# Patient Record
Sex: MALE | HISPANIC OR LATINO | Employment: FULL TIME | URBAN - METROPOLITAN AREA
[De-identification: names, ages, dates, MRNs, and addresses within clinical notes are randomized per-mention and may not be internally consistent; named-entity substitution may affect disease eponyms.]

---

## 2017-01-27 ENCOUNTER — APPOINTMENT (OUTPATIENT)
Dept: RADIOLOGY | Facility: MEDICAL CENTER | Age: 52
End: 2017-01-27
Attending: EMERGENCY MEDICINE
Payer: COMMERCIAL

## 2017-01-27 ENCOUNTER — HOSPITAL ENCOUNTER (EMERGENCY)
Facility: MEDICAL CENTER | Age: 52
End: 2017-01-27
Attending: EMERGENCY MEDICINE
Payer: COMMERCIAL

## 2017-01-27 VITALS
SYSTOLIC BLOOD PRESSURE: 151 MMHG | DIASTOLIC BLOOD PRESSURE: 79 MMHG | RESPIRATION RATE: 18 BRPM | BODY MASS INDEX: 32.27 KG/M2 | WEIGHT: 182.1 LBS | OXYGEN SATURATION: 98 % | TEMPERATURE: 97.3 F | HEART RATE: 79 BPM | HEIGHT: 63 IN

## 2017-01-27 DIAGNOSIS — S42.142A GLENOID FRACTURE OF SHOULDER, LEFT, CLOSED, INITIAL ENCOUNTER: ICD-10-CM

## 2017-01-27 DIAGNOSIS — S42.152A GLENOID FRACTURE OF SHOULDER, LEFT, CLOSED, INITIAL ENCOUNTER: ICD-10-CM

## 2017-01-27 PROCEDURE — 73200 CT UPPER EXTREMITY W/O DYE: CPT | Mod: LT

## 2017-01-27 PROCEDURE — 73030 X-RAY EXAM OF SHOULDER: CPT | Mod: LT

## 2017-01-27 PROCEDURE — 700102 HCHG RX REV CODE 250 W/ 637 OVERRIDE(OP): Performed by: EMERGENCY MEDICINE

## 2017-01-27 PROCEDURE — 36415 COLL VENOUS BLD VENIPUNCTURE: CPT

## 2017-01-27 PROCEDURE — A9270 NON-COVERED ITEM OR SERVICE: HCPCS | Performed by: EMERGENCY MEDICINE

## 2017-01-27 PROCEDURE — 99284 EMERGENCY DEPT VISIT MOD MDM: CPT

## 2017-01-27 RX ORDER — IBUPROFEN 600 MG/1
600 TABLET ORAL ONCE
Status: COMPLETED | OUTPATIENT
Start: 2017-01-27 | End: 2017-01-27

## 2017-01-27 RX ORDER — HYDROCODONE BITARTRATE AND ACETAMINOPHEN 5; 325 MG/1; MG/1
1 TABLET ORAL EVERY 4 HOURS PRN
Qty: 9 TAB | Refills: 0 | Status: SHIPPED | OUTPATIENT
Start: 2017-01-27

## 2017-01-27 RX ADMIN — IBUPROFEN 600 MG: 600 TABLET, FILM COATED ORAL at 20:58

## 2017-01-27 NOTE — ED AVS SNAPSHOT
Slantrange Access Code: [email or web address]  Expires: 2/26/2017 10:44 PM    Your email address is not on file at [facility].  Email Addresses are required for you to sign up for Slantrange, please contact [phone] to verify your personal information and to provide your email address prior to attempting to register for Slantrange.    Venanciojacinto Rosado  [address] [place] [address]    Slantrange  A secure, online tool to manage your health information     [facility]’s Slantrange® is a secure, online tool that connects you to your personalized health information from the privacy of your home -- day or night - making it very easy for you to manage your healthcare. Once the activation process is completed, you can even access your medical information using the Slantrange finn, which is available for free in the Apple Finn store or Google Play store.     To learn more about Slantrange, visit [email or web address]    There are two levels of access available (as shown below):   My Chart Features  [facility] Primary Care Doctor [facility]  Specialists [facility]  Urgent  Care Non-[facility] Primary Care Doctor   Email your healthcare team securely and privately 24/7 X X X    Manage appointments: schedule your next appointment; view details of past/upcoming appointments X      Request prescription refills. X      View recent personal medical records, including lab and immunizations X X X X   View health record, including health history, allergies, medications X X X X   Read reports about your outpatient visits, procedures, consult and ER notes X X X X   See your discharge summary, which is a recap of your hospital and/or ER visit that includes your diagnosis, lab results, and care plan X X  X     How to register for Audacioust:  Once your e-mail address has been verified, follow the following steps to sign up for Audacioust.     1. Go to  [email or web address]  2. Click on the Sign Up Now box, which takes you to the New Member Sign Up  page. You will need to provide the following information:  a. Enter your Tilson Access Code exactly as it appears at the top of this page. (You will not need to use this code after you’ve completed the sign-up process. If you do not sign up before the expiration date, you must request a new code.)   b. Enter your date of birth.   c. Enter your home email address.   d. Click Submit, and follow the next screen’s instructions.  3. Create a Tilson ID. This will be your Tilson login ID and cannot be changed, so think of one that is secure and easy to remember.  4. Create a Tilson password. You can change your password at any time.  5. Enter your Password Reset Question and Answer. This can be used at a later time if you forget your password.   6. Enter your e-mail address. This allows you to receive e-mail notifications when new information is available in Tilson.  7. Click Sign Up. You can now view your health information.    For assistance activating your Tilson account, call [phone]

## 2017-01-27 NOTE — LETTER
"  FORM C-4:  EMPLOYEE’S CLAIM FOR COMPENSATION/ REPORT OF INITIAL TREATMENT  EMPLOYEE’S CLAIM - PROVIDE ALL INFORMATION REQUESTED   First Name  Venancio Last Name  Paolo Rosado Birthdate             Age  1965 51 y.o. 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 (if applicable)  covering/concrete    How did this injury or occupational disease occur? Be specific and answer in detail. Use additional sheet if necessary)  I was covering, putting a blanket, on concrete.   If you believe that you have an occupational disease, when did you first have knowledge of the disability and it relationship to your employment?  n/a Witnesses to the Accident  Brenden Boone     Nature of Injury or Occupational Disease  Strain  Part(s) of Body Injured or Affected  Shoulder (L), N/A, N/A    I certify that the above is true and correct to the best of my knowledge and that I have provided this information in order to obtain the benefits of [place] " [place]).  I hereby authorize any physician, chiropractor, surgeon, practitioner, or other person, any hospital, including [facility] hospital, any medical service organization, any insurance company, or other institution or organization to release to each other, any medical or other information, including benefits paid or payable, pertinent to this injury or disease, except information relative to diagnosis, treatment and/or counseling for AIDS, psychological conditions, alcohol or controlled substances, for which I must give specific authorization.  A Photostat of this authorization shall be as valid as the original.   Date  1/27/2017 Place  [facility] ED Employee’s Signature   THIS REPORT MUST BE COMPLETED AND MAILED WITHIN 3 WORKING DAYS OF TREATMENT   Place  [facility], EMERGENCY DEPT  Name of Facility   [facility]   Date  1/27/2017 Diagnosis  (S42.142A,  S42.152A) Glenoid fracture of shoulder, left, closed, initial encounter Is there evidence the injured employee was under the influence of alcohol and/or another controlled substance at the time of accident?   Hour  10:28 PM Description of Injury or Disease  Glenoid fracture of shoulder, left, closed, initial encounter No   Treatment  Sling, pain medication  Have you advised the patient to remain off work five days or more?         No   X-Ray Findings  Positive   If Yes   From Date    To Date      From information given by the employee, together with medical evidence, can you directly connect this injury or occupational disease as job incurred?  Yes If No, is the employee capable of: Full Duty  No Modified Duty  Yes   Is additional medical care by a physician indicated?  Yes If Modified Duty, Specify any Limitations / Restrictions  Patient is able to perform tasks that do not require any use of the left  "arm     Do you know of any previous injury or disease contributing to this condition or occupational disease?  No   Date  1/27/2017 Print Doctor’s Name  Natividad Lynch certify the employer’s copy of this form was mailed on:   Address  [address] [address] [phone] Insurer’s Use Only   [place]  [id number]    Provider’s Tax ID Number  [id number] Telephone  Dept: [phone]    Doctor’s Signature  e-NATIVIDAD Robles M.D. Degree   MD    Original - TREATING PHYSICIAN OR CHIROPRACTOR   Pg 2-Insurer/TPA   Pg 3-Employer   Pg 4-Employee                                                                                                  Form C-4 (rev01/03)     BRIEF DESCRIPTION OF RIGHTS AND BENEFITS  (Pursuant to [place]RS 616C.050)    Notice of Injury or Occupational Disease (Incident Report Form C-1): If an injury or occupational disease (OD) arises out of and in the course of employment, you must provide written notice to your employer as soon as practicable, but no later than 7 days after the accident or OD. Your employer shall maintain a sufficient supply of the required forms.    Claim for Compensation (Form C-4): If medical treatment is sought, the form C-4 is available at the place of initial treatment. A completed \"Claim for Compensation\" (Form C-4) must be filed within 90 days after an accident or OD. The treating physician or chiropractor must, within 3 working days after treatment, complete and mail to the employer, the employer's insurer and third-party , the Claim for Compensation.    Medical Treatment: If you require medical treatment for your on-the-job injury or OD, you may be required to select a physician or chiropractor from a list provided by your workers’ compensation insurer, if it has contracted with an Organization for Managed Care (MCO) or Preferred Provider Organization (PPO) or providers of health care. If your employer has not entered into a " contract with an MCO or PPO, you may select a physician or chiropractor from the Panel of Physicians and Chiropractors. Any medical costs related to your industrial injury or OD will be paid by your insurer.    Temporary Total Disability (TTD): If your doctor has certified that you are unable to work for a period of at least 5 consecutive days, or 5 cumulative days in a 20-day period, or places restrictions on you that your employer does not accommodate, you may be entitled to TTD compensation.    Temporary Partial Disability (TPD): If the wage you receive upon reemployment is less than the compensation for TTD to which you are entitled, the insurer may be required to pay you TPD compensation to make up the difference. TPD can only be paid for a maximum of 24 months.    Permanent Partial Disability (PPD): When your medical condition is stable and there is an indication of a PPD as a result of your injury or OD, within 30 days, your insurer must arrange for an evaluation by a rating physician or chiropractor to determine the degree of your PPD. The amount of your PPD award depends on the date of injury, the results of the PPD evaluation and your age and wage.    Permanent Total Disability (PTD): If you are medically certified by a treating physician or chiropractor as permanently and totally disabled and have been granted a PTD status by your insurer, you are entitled to receive monthly benefits not to exceed 66 2/3% of your average monthly wage. The amount of your PTD payments is subject to reduction if you previously received a PPD award.    Vocational Rehabilitation Services: You may be eligible for vocational rehabilitation services if you are unable to return to the job due to a permanent physical impairment or permanent restrictions as a result of your injury or occupational disease.    Transportation and Per Rommel Reimbursement: You may be eligible for travel expenses and per rommel associated with medical  treatment.  Reopening: You may be able to reopen your claim if your condition worsens after claim closure.    Appeal Process: If you disagree with a written determination issued by the insurer or the insurer does not respond to your request, you may appeal to the Department of Administration, , by following the instructions contained in your determination letter. You must appeal the determination within 70 days from the date of the determination letter at [address], [address], [place], [place] [address], or [address], [address], [place], [place] [address]. If you disagree with the  decision, you may appeal to the Department of Administration, . You must file your appeal within 30 days from the date of the  decision letter at [address], [address], [place], [place] [address], or [address], [place], [place] [address]. If you disagree with a decision of an , you may file a petition for judicial review with the District Court. You must do so within 30 days of the Appeal Officer’s decision. You may be represented by an  at your own expense or you may contact the [facility] for possible representation.    [place]  for Injured Workers (NAIW): If you disagree with a  decision, you may request that NAIW represent you without charge at an  Hearing. For information regarding denial of benefits, you may contact the [facility] at: [address], [address], [place] [address], [phone], or [address], [address], [place] [address], [phone]    To File a Complaint with the Division: If you wish to file a complaint with the  of the Division of Industrial Relations (DIR), please contact the Workers’ Compensation Section, [address], [address], [place], [place] [address], telephone [phone]) [phone], or [address], [address], [place], [place] [address], telephone [phone].    For assistance with Workers’ Compensation Issues: you may contact the Office of the Governor Consumer Health Assistance, [address] [place] [address], [address], [place], [place] [address], Toll Free 1-929.169.3593, Web site: http://GoPlaceIt.[facility].[email or web address]., E-mail [email or web address].                                                                                                                                                                               __________________________________________________________________                                    _________________            Employee Name / Signature                                                                                                                            Date                                       D-2 (rev. 10/07)

## 2017-01-27 NOTE — ED AVS SNAPSHOT
1/27/2017          Venancio Rosado  [address] [address]    Dear Venancio:    [facility] wants to ensure your discharge home is safe and you or your loved ones have had all your questions answered regarding your care after you leave the hospital.    You may receive a telephone call within two days of your discharge.  This call is to make certain you understand your discharge instructions as well as ensure we provided you with the best care possible during your stay with us.     The call will only last approximately 3-5 minutes and will be done by a nurse.    Once again, we want to ensure your discharge home is safe and that you have a clear understanding of any next steps in your care.  If you have any questions or concerns, please do not hesitate to contact us, we are here for you.  Thank you for choosing [facility] for your healthcare needs.    Sincerely,    Gino Keyes    [facility]

## 2017-01-27 NOTE — ED AVS SNAPSHOT
After Visit Summary                                                                                                                Venancio Rosado   MRN: [id number]    Department:  [facility], Emergency Dept   Date of Visit:  1/27/2017            [facility], Emergency Dept    [address] [place] [id number]    Phone:  [phone]      You were seen by     Jen Lynch M.D.      Your Diagnosis Was     Glenoid fracture of shoulder, left, closed, initial encounter     S42.142A, S42.152A       These are the medications you received during your hospitalization from 01/27/2017 1946 to 01/27/2017 2244     Date/Time Order Dose Route Action    01/27/2017 2058 ibuprofen (MOTRIN) tablet 600 mg 600 mg Oral Given      Follow-up Information     1. Schedule an appointment as soon as possible for a visit with [facility].    Contact information    [address] [address] [phone]          2. Go to [facility], Emergency Dept.    Specialty:  Emergency Medicine    Why:  If symptoms worsen or do not continue to improve    Contact information    [address] [place] [address] [phone]      Medication Information     Review all of your home medications and newly ordered medications with your primary doctor and/or pharmacist as soon as possible. Follow medication instructions as directed by your doctor and/or pharmacist.     Please keep your complete medication list with you and share with your physician. Update the information when medications are discontinued, doses are changed, or new medications (including over-the-counter products) are added; and carry medication information at all times in the event of emergency situations.               Medication List      START taking these medications        Instructions    hydrocodone-acetaminophen 5-325 MG Tabs per tablet   Commonly known as:  NORCO    Take 1 Tab by mouth every four  hours as needed for up to 9 doses.   Dose:  1 Tab         ASK your doctor about these medications        Instructions    lisinopril 10 MG Tabs   Commonly known as:  PRINIVIL    Take 1 Tab by mouth every day.   Dose:  10 mg       metformin 500 MG Tabs   Commonly known as:  GLUCOPHAGE    Take 1 Tab by mouth 2 times a day, with meals.   Dose:  500 mg       oxycodone-acetaminophen 5-325 MG Tabs   Commonly known as:  PERCOCET    Take 1-2 Tabs by mouth every four hours as needed for Moderate Pain.   Dose:  1-2 Tab               Procedures and tests performed during your visit     CT-SHOULDER W/O LEFT    DX-SHOULDER 2+ LEFT    SLING        Discharge Instructions       Please follow-up with [facility] for physical therapy and orthopedic referral as indicated. Please return to the emergency department if you develop any worsening pain, or if you have any further concerns. Additionally, please return if you're not able to follow-up at occupational health. Please use the sling until told to remove it by a follow-up physician.      Shoulder Fracture  You have a fractured humerus (bone in the upper arm) at the shoulder just below the ball of the shoulder joint. Most of the time the bones of a broken shoulder are in an acceptable position. Usually the injury can be treated with a shoulder immobilizer or sling and swath bandage. These devices support the arm and prevent any shoulder movement. If the bones are not in a good position, then surgery is sometimes needed. Shoulder fractures usually cause swelling, pain, and discoloration around the upper arm initially. They heal in 8-12 weeks with proper treatment.  Rest in bed or a reclining chair as long as your shoulder is very painful. Sitting up generally results in less pain at the fracture site. Do not remove your shoulder bandage until your caregiver approves. You may apply ice packs over the shoulder for 20-30 minutes every 2 hours for the next 2-3 days to  reduce the pain and swelling. Use your pain medicine as prescribed.   SEEK IMMEDIATE MEDICAL CARE IF:  · You develop severe shoulder pain unrelieved by rest and taking pain medicine.  · You have pain, numbness, tingling, or weakness in the hand or wrist.  · You develop shortness of breath, chest pain, severe weakness, or fainting.  · You have severe pain with motion of the fingers or wrist.  MAKE SURE YOU:   · Understand these instructions.  · Will watch your condition.  · Will get help right away if you are not doing well or get worse.     This information is not intended to replace advice given to you by your health care provider. Make sure you discuss any questions you have with your health care provider.     Document Released: 01/25/2006 Document Revised: 03/11/2013 Document Reviewed: 04/07/2010  Criptext Interactive Patient Education ©2016 [employer]            Patient Information     Patient Information    Following emergency treatment: all patient requiring follow-up care must return either to a private physician or a clinic if your condition worsens before you are able to obtain further medical attention, please return to the emergency room.     Billing Information    At [facility], we work to make the billing process streamlined for our patients.  Our Representatives are here to answer any questions you may have regarding your hospital bill.  If you have insurance coverage and have supplied your insurance information to us, we will submit a claim to your insurer on your behalf.  Should you have any questions regarding your bill, we can be reached online or by phone as follows:  Online: You are able pay your bills online or live chat with our representatives about any billing questions you may have. We are here to help Monday - Friday from 8:00am to 7:30pm and 9:00am - 12:00pm on Saturdays.  Please visit [email or web address]  for more information.   Phone:  [phone]  or [phone]    Please note that your emergency physician, surgeon, pathologist, radiologist, anesthesiologist, and other specialists are not employed by [facility] and will therefore bill separately for their services.  Please contact them directly for any questions concerning their bills at the numbers below:     Emergency Physician Services:  [phone]  [facility]:  [phone]  [facility]:  [phone]  [facility]:  [phone]    1. Your final bill may vary from the amount quoted upon discharge if all procedures are not complete at that time, or if your doctor has additional procedures of which we are not aware. You will receive an additional bill if you return to the Emergency Department at [facility] for suture removal regardless of the facility of which the sutures were placed.     2. Please arrange for settlement of this account at the emergency registration.    3. All self-pay accounts are due in full at the time of treatment.  If you are unable to meet this obligation then payment is expected within 4-5 days.     4. If you have had radiology studies (CT, X-ray, Ultrasound, MRI), you have received a preliminary result during your emergency department visit. Please contact the radiology department [phone] to receive a copy of your final result. Please discuss the Final result with your primary physician or with the follow up physician provided.     Crisis Hotline:  [place] Crisis Hotline:  0-920-OPLHXDV or 1-574.929.6372  [place] Crisis Hotline:    1-825.940.4982 or 334-098-1546         ED Discharge Follow Up Questions    1. In order to provide you with very good care, we would like to follow up with a phone call in the next few days.  May we have your permission to contact you?     YES /  NO    2. What is the best phone number to call you? (       )_____-__________    3. What is the best time to call you?      Morning  /  Afternoon  /   Evening                   Patient Signature:  ____________________________________________________________    Date:  ____________________________________________________________

## 2017-01-28 NOTE — ED NOTES
Patient fell today during work and landed on his arm in an effort to brace himself, states he heard a pop, thinks it may be dislocated, pain with movement, no major deformity noted, no major swelling.

## 2017-01-28 NOTE — DISCHARGE INSTRUCTIONS
Please follow-up with [facility] occupational [facility] for physical therapy and orthopedic referral as indicated. Please return to the emergency department if you develop any worsening pain, or if you have any further concerns. Additionally, please return if you're not able to follow-up at occupational health. Please use the sling until told to remove it by a follow-up physician.      Shoulder Fracture  You have a fractured humerus (bone in the upper arm) at the shoulder just below the ball of the shoulder joint. Most of the time the bones of a broken shoulder are in an acceptable position. Usually the injury can be treated with a shoulder immobilizer or sling and swath bandage. These devices support the arm and prevent any shoulder movement. If the bones are not in a good position, then surgery is sometimes needed. Shoulder fractures usually cause swelling, pain, and discoloration around the upper arm initially. They heal in 8-12 weeks with proper treatment.  Rest in bed or a reclining chair as long as your shoulder is very painful. Sitting up generally results in less pain at the fracture site. Do not remove your shoulder bandage until your caregiver approves. You may apply ice packs over the shoulder for 20-30 minutes every 2 hours for the next 2-3 days to reduce the pain and swelling. Use your pain medicine as prescribed.   SEEK IMMEDIATE MEDICAL CARE IF:  · You develop severe shoulder pain unrelieved by rest and taking pain medicine.  · You have pain, numbness, tingling, or weakness in the hand or wrist.  · You develop shortness of breath, chest pain, severe weakness, or fainting.  · You have severe pain with motion of the fingers or wrist.  MAKE SURE YOU:   · Understand these instructions.  · Will watch your condition.  · Will get help right away if you are not doing well or get worse.     This information is not intended to replace advice given to you by your health care provider. Make sure you discuss any  questions you have with your health care provider.     Document Released: 01/25/2006 Document Revised: 03/11/2013 Document Reviewed: 04/07/2010  Elsevier Interactive Patient Education ©2016 Elsevier Inc.

## 2017-01-28 NOTE — ED PROVIDER NOTES
"      ED Provider Note    Scribed for Jen Lynch M.D. by Mamie Isaac. 1/27/2017, 8:23 PM.    Primary Care Provider: Pcp Pt States None  Means of arrival: walk-in  History obtained from: Patient  History limited by: None     CHIEF COMPLAINT  Chief Complaint   Patient presents with   • Shoulder Injury     left        HPI  Vneancio Rosado is a 51 y.o. male who presents to the Emergency Department for evaluation of left shoulder injury. Patient reports he fell today at work and landed on his left arm approximately 2 hours ago. He states he heard his left shoulder \"pop\" during the time of the accident. He states associated pain to left shoulder and reports pain is exacerbated by his lifting the arm. Denies taking any ibuprofen or tylenol for the pain. Denies loss of sensation to the arm. The patient denies any past pertinent medical history, use of daily medications or allergies to medications.    REVIEW OF SYSTEMS  Pertinent positives include left shoulder pain. Pertinent negatives include no loss of sensation to the left arm. See HPI for further details.     PAST MEDICAL HISTORY   has a past medical history of Gallstones.    SOCIAL HISTORY  Social History   Substance Use Topics   • Smoking status: Current Some Day Smoker     Types: Cigarettes   • Alcohol Use: Yes      Comment: couple a day before Dec/16      SURGICAL HISTORY   has past surgical history that includes nataliia by laparoscopy (N/A, 3/17/2016).     CURRENT MEDICATIONS    No current facility-administered medications on file prior to encounter.     Current Outpatient Prescriptions on File Prior to Encounter   Medication Sig Dispense Refill   • metformin (GLUCOPHAGE) 500 MG Tab Take 1 Tab by mouth 2 times a day, with meals. 60 Tab 0   • lisinopril (PRINIVIL) 10 MG Tab Take 1 Tab by mouth every day. 30 Tab 0   • oxycodone-acetaminophen (PERCOCET) 5-325 MG Tab Take 1-2 Tabs by mouth every four hours as needed for Moderate Pain. 28 Tab 0 " "    ALLERGIES  No Known Allergies    PHYSICAL EXAM  VITAL SIGNS: /99 mmHg  Pulse 74  Temp(Src) 36.3 °C (97.3 °F)  Resp 14  Ht 1.6 m (5' 3\")  Wt 82.6 kg (182 lb 1.6 oz)  BMI 32.27 kg/m2  SpO2 98%  Constitutional: Alert in no apparent distress.  HENT: Normocephalic, Bilateral external ears normal. Nose normal.    Skin: Visualized skin is  Dry, No erythema, No rash. No skin breakdown, no lacerations or abrasions  Extremities: Left shoulder with anterior soft tissue tenderness to palpation, limited range of motion on abduction, no visible deformity. Left upper extremity neurovascularly intact with normal capillary refill, 2+ radial pulses, sensory and motor function intact in median, radial and ulnar nerve distributions. Soft compartments. No wrist or elbow tenderness to palpation.  Neurologic: Motor and sensory function intact as documented above  Psychiatric: Affect and Mood normal    Radiology results show:   CT-SHOULDER W/O LEFT   Final Result      1.  Acute LEFT inferior glenoid fracture.   2.  Small ossific density at the superior glenoid, of uncertain acuity.   3.  No LEFT shoulder dislocation.      DX-SHOULDER 2+ LEFT   Final Result      1.  Apparent fracture of the inferior glenoid.   2.  No LEFT shoulder dislocation.          COURSE & MEDICAL DECISION MAKING  Nursing notes, VS, PMSFHx reviewed in chart.    8:23 PM - Patient seen and examined at bedside. Ordered DX-Shoulder 2+ Left to evaluate his symptoms. I explained to the patient I will order an xray to rule out fracture or dislocation.     8:59 PM Recheck: Patient is resting comfortably and reports feeling improved. I updated him on his results, which showed a potential fracture. Discussed with patient a CT-Shoulder will be completed for further evaluation. He verbalizes understanding and agreement. Ordered CT-Shoulder W/O Left. Patient will be treated with Motrin 600 mg at this time.     10:10 PM I reviewed patient's CT-Shoulder imaging, " paged orthopedics.     10:18 PM I discussed the patient's case and the above findings with Dr. Crawford (orthopedics) who advised patient's shoulder be stabilized with a sling and to follow up with occupational health.    10:30 PM Patient is resting comfortably and reports feeling improved. I updated him on his results, which showed an apparent fracture. I explained that he is now stable for discharge with a prescription for Norco as needed. I advised him to follow up with [facility] Occupational Health and to return to the ED for worsening of shoulder pain or any other medical problems. He understands and will comply.     Decision Making:  This is a 51 y.o. year old who presents with left shoulder injury after a recent fall. Plain film was concerning for glenoid fracture, CT ordered which confirms left inferior glenoid fracture. No fevers, no overlying warmth or redness, no evidence of open fracture. I did discuss recommendations with Dr. Calle who recommends sling, patient's follow-up will be through [facility] occupational health who will then refer to orthopedics as needed. Patient is neurovascularly intact. Return precautions given including worsening pain or inability to arrange follow-up with occupational health.    I reviewed prescription monitoring program for patient's narcotic use before prescribing a scheduled drug.The patient will not drink alcohol nor drive with prescribed medications. The patient will return for new or worsening symptoms and is stable at the time of discharge.    The patient is referred to a primary physician for blood pressure management, diabetic screening, and for all other preventative health concerns.    DISPOSITION:  Patient will be discharged home in stable condition.    FOLLOW UP:  [address] [address]  [phone]    Schedule an appointment as soon as possible for a visit      [facility], Emergency Dept  [address] [place] [address]  [place] [address]  [phone]  Go to  If symptoms worsen or do not continue to improve      OUTPATIENT MEDICATIONS:  Discharge Medication List as of 1/27/2017 10:44 PM      START taking these medications    Details   hydrocodone-acetaminophen (NORCO) 5-325 MG Tab per tablet Take 1 Tab by mouth every four hours as needed for up to 9 doses., Disp-9 Tab, R-0, Print Rx Paper           FINAL IMPRESSION  1. Glenoid fracture of shoulder, left, closed, initial encounter         IMamie (Scribe), am scribing for, and in the presence of, Jen Lynch M.D..    Electronically signed by: Mamie Isaac (Scribe), 1/27/2017    IJen M.D. personally performed the services described in this documentation, as scribed by Mamie Isaac in my presence, and it is both accurate and complete.    The note accurately reflects work and decisions made by me.  Jen Lynch  1/27/2017  11:11 PM

## 2017-02-01 ENCOUNTER — OCCUPATIONAL MEDICINE (OUTPATIENT)
Dept: OCCUPATIONAL MEDICINE | Facility: CLINIC | Age: 52
End: 2017-02-01
Payer: COMMERCIAL

## 2017-02-01 VITALS
SYSTOLIC BLOOD PRESSURE: 132 MMHG | HEIGHT: 64 IN | OXYGEN SATURATION: 95 % | DIASTOLIC BLOOD PRESSURE: 78 MMHG | RESPIRATION RATE: 14 BRPM | HEART RATE: 94 BPM | WEIGHT: 185 LBS | TEMPERATURE: 98 F | BODY MASS INDEX: 31.58 KG/M2

## 2017-02-01 DIAGNOSIS — S42.152D: ICD-10-CM

## 2017-02-01 DIAGNOSIS — S42.142D: ICD-10-CM

## 2017-02-01 PROCEDURE — 99204 OFFICE O/P NEW MOD 45 MIN: CPT | Performed by: PREVENTIVE MEDICINE

## 2017-02-01 RX ORDER — IBUPROFEN 600 MG/1
600 TABLET ORAL EVERY 6 HOURS PRN
COMMUNITY
End: 2017-02-07 | Stop reason: SDUPTHER

## 2017-02-01 ASSESSMENT — PAIN SCALES - GENERAL: PAINLEVEL: 6=MODERATE PAIN

## 2017-02-01 NOTE — Clinical Note
"[facility]   [address] [place] [address],   [address] [address]  Phone: [phone] - Fax: [phone]        [facility] Progress Report and Disability Certification  Date of Service: 2/1/2017   No Show:  No  Date / Time of Next Visit: 2/7/2017@4:00PM    Claim Information   Patient Name: Venancio Rosado  Claim Number:     Employer:   [employer]  Date of Injury: 1/27/2017     Insurer / TPA: [employer]  ID / SSN:     Occupation: finisher  Diagnosis: The encounter diagnosis was Glenoid fracture of shoulder, left, with routine healing, subsequent encounter.    Medical Information   Related to Industrial Injury? Yes    Subjective Complaints:  DOI 1/27/2017: Patient was covering concrete when he tripped and fell, landing on his left shoulder. He heard a \"pop\" when he landed. Seen in ED, XR and CT scan showed inferior glenoid fracture of the left shoulder with 2mm articular gap. Ortho was consulted via phone and recommended sling and follow up with [facility], referral to Ortho if needed. Patient states the pain has been improving. However he still has the difficulty  lifting his arm at all. He has been in the sling since the injury. Denies any numbness or tingling. He takes ibuprofen for pain relief which has been sufficient. He is right-handed.   Objective Findings: Left Shoulder: No gross deformity. Tenderness palpation anterior GH and lateral shoulder. Decreased range of motion globally especially with flexion 40°, abduction 40°.  strength intact. Reflexes 2+/4.   Pre-Existing Condition(s):     Assessment:   Condition Improved    Status: Additional Care Required  Permanent Disability:No    Plan:      Diagnostics:      Comments:  Continue use sling most the time, use at work, may take off for short periods of time while at home  Begin pendulum and range of motion exercises as tolerated  Continue ibuprofen as needed  May begin unrestricted " work  Follow-up one week    Disability Information   Status: Released to Restricted Duty    From:  2/1/2017  Through: 2/7/2017 Restrictions are: Temporary   Physical Restrictions   Sitting:    Standing:    Stooping:    Bending:      Squatting:    Walking:    Climbing:    Pushing:      Pulling:    Other:    Reaching Above Shoulder (L):   Reaching Above Shoulder (R):       Reaching Below Shoulder (L):    Reaching Below Shoulder (R):      Not to exceed Weight Limits   Carrying(hrs):   Weight Limit(lb):   Lifting(hrs):   Weight  Limit(lb):     Comments: No use of left arm. Must use sling at work    Repetitive Actions   Hands: i.e. Fine Manipulations from Grasping:     Feet: i.e. Operating Foot Controls:     Driving / Operate Machinery:     Physician Name: Toi Campbell D.O. Physician Signature: TOI Marrero D.O. e-Signature: Dr. Srini Canela, Medical Director   Clinic Name / Location: [address],   [address] [place] [address] Clinic Phone Number: Dept: [phone]   Appointment Time: 4:00 Pm Visit Start Time: 3:45 PM   Check-In Time:  3:34 Pm Visit Discharge Time:  @4:09PM   Original-Treating Physician or Chiropractor    Page 2-Insurer/TPA    Page 3-Employer    Page 4-Employee

## 2017-02-01 NOTE — MR AVS SNAPSHOT
"        Venancio BooneOdalis   2017 4:00 PM   Occupational Medicine   MRN: [id number]    Department:  [facility]   Dept Phone:  [phone]    Description:  Male : 1965   Provider:  Toi Campbell D.O.           Reason for Visit     Follow-Up WC DOI 2017 - Shoulder - Better - ROOM 2      Allergies as of 2017     No Known Allergies      You were diagnosed with     Glenoid fracture of shoulder, left, with routine healing, subsequent encounter   [[id number]]         Vital Signs     Blood Pressure Pulse Temperature Respirations Height Weight    132/78 mmHg 94 36.7 °C (98 °F) 14 1.626 m (5' 4.02\") 83.915 kg (185 lb)    Body Mass Index Oxygen Saturation Smoking Status             31.74 kg/m2 95% Current Some Day Smoker         Basic Information     Date Of Birth Sex Race Ethnicity Preferred Language    1965 Male Patient Refused  Origin (Irish,Kittitian,Brazilian,Michael, etc) English      Your appointments     2017  4:00 PM   Workers Compensation with Toi Campbell D.O.   [address]  [address]  [facility] [address]   [phone]              Health Maintenance     Patient has no pending health maintenance at this time      Current Immunizations     No immunizations on file.      Below and/or attached are the medications your provider expects you to take. Review all of your home medications and newly ordered medications with your provider and/or pharmacist. Follow medication instructions as directed by your provider and/or pharmacist. Please keep your medication list with you and share with your provider. Update the information when medications are discontinued, doses are changed, or new medications (including over-the-counter products) are added; and carry medication information at all times in the event of emergency situations     Allergies:  No Known Allergies          Medications  Valid as of: 2017 -  4:20 PM   " Generic Name Brand Name Tablet Size Instructions for use    Hydrocodone-Acetaminophen (Tab) NORCO 5-325 MG Take 1 Tab by mouth every four hours as needed for up to 9 doses.        Ibuprofen (Tab) MOTRIN 600 MG Take 600 mg by mouth every 6 hours as needed.        Lisinopril (Tab) PRINIVIL 10 MG Take 1 Tab by mouth every day.        MetFORMIN HCl (Tab) GLUCOPHAGE 500 MG Take 1 Tab by mouth 2 times a day, with meals.        Oxycodone-Acetaminophen (Tab) PERCOCET 5-325 MG Take 1-2 Tabs by mouth every four hours as needed for Moderate Pain.        .                 Medicines prescribed today were sent to:     [facility] #[id number] - [place], [place] - [address] [facility]    [address] [facility] [address]    Phone: [phone] Fax: [phone]    Open 24 Hours?: No      Medication refill instructions:       If your prescription bottle indicates you have medication refills left, it is not necessary to call your provider’s office. Please contact your pharmacy and they will refill your medication.    If your prescription bottle indicates you do not have any refills left, you may request refills at any time through one of the following ways: The online [employer] Private system (except Urgent Care), by calling your provider’s office, or by asking your pharmacy to contact your provider’s office with a refill request. Medication refills are processed only during regular business hours and may not be available until the next business day. Your provider may request additional information or to have a follow-up visit with you prior to refilling your medication.   *Please Note: Medication refills are assigned a new Rx number when refilled electronically. Your pharmacy may indicate that no refills were authorized even though a new prescription for the same medication is available at the pharmacy. Please request the medicine by name with the pharmacy before contacting your provider for a refill.           [employer] Access Code:  [email or web address]  Expires: 2/26/2017 10:44 PM    [employer]  A secure, online tool to manage your health information     [employer]’s EquityZen® is a secure, online tool that connects you to your personalized health information from the privacy of your home -- day or night - making it very easy for you to manage your healthcare. Once the activation process is completed, you can even access your medical information using the EquityZen finn, which is available for free in the Apple Finn store or Google Play store.     EquityZen provides the following levels of access (as shown below):   My Chart Features   [facility] [place] Primary Care Doctor [facility]  Specialists [facility]  Urgent  Care Non-[facility]  Primary Care  Doctor   Email your healthcare team securely and privately 24/7 X X X    Manage appointments: schedule your next appointment; view details of past/upcoming appointments X      Request prescription refills. X      View recent personal medical records, including lab and immunizations X X X X   View health record, including health history, allergies, medications X X X X   Read reports about your outpatient visits, procedures, consult and ER notes X X X X   See your discharge summary, which is a recap of your hospital and/or ER visit that includes your diagnosis, lab results, and care plan. X X       How to register for EquityZen:  1. Go to  [email or web address].  2. Click on the Sign Up Now box, which takes you to the New Member Sign Up page. You will need to provide the following information:  a. Enter your EquityZen Access Code exactly as it appears at the top of this page. (You will not need to use this code after you’ve completed the sign-up process. If you do not sign up before the expiration date, you must request a new code.)   b. Enter your date of birth.   c. Enter your home email address.   d. Click Submit, and follow the next screen’s instructions.  3. Create a EquityZen ID. This will be your EquityZen login ID and cannot be  changed, so think of one that is secure and easy to remember.  4. Create a [employer] password. You can change your password at any time.  5. Enter your Password Reset Question and Answer. This can be used at a later time if you forget your password.   6. Enter your e-mail address. This allows you to receive e-mail notifications when new information is available in [employer].  7. Click Sign Up. You can now view your health information.    For assistance activating your [employer] account, call [phone]

## 2017-02-02 NOTE — PROGRESS NOTES
"Subjective:      Venancio Rosado is a 51 y.o. male who presents with Follow-Up      DOI 1/27/2017: Patient was covering concrete when he tripped and fell, landing on his left shoulder. He heard a \"pop\" when he landed. Seen in ED, XR and CT scan showed inferior glenoid fracture of the left shoulder with 2mm articular gap. Ortho was consulted via phone and recommended sling and follow up with [facility], referral to Ortho if needed. Patient states the pain has been improving. However he still has the difficulty  lifting his arm at all. He has been in the sling since the injury. Denies any numbness or tingling. He takes ibuprofen for pain relief which has been sufficient. He is right-handed.     HPI    ROS  PMH:  has a past medical history of Gallstones.  MEDS:   Current outpatient prescriptions:   •  ibuprofen (MOTRIN) 600 MG Tab, Take 600 mg by mouth every 6 hours as needed., Disp: , Rfl:   •  hydrocodone-acetaminophen (NORCO) 5-325 MG Tab per tablet, Take 1 Tab by mouth every four hours as needed for up to 9 doses., Disp: 9 Tab, Rfl: 0  •  metformin (GLUCOPHAGE) 500 MG Tab, Take 1 Tab by mouth 2 times a day, with meals., Disp: 60 Tab, Rfl: 0  •  lisinopril (PRINIVIL) 10 MG Tab, Take 1 Tab by mouth every day., Disp: 30 Tab, Rfl: 0  •  oxycodone-acetaminophen (PERCOCET) 5-325 MG Tab, Take 1-2 Tabs by mouth every four hours as needed for Moderate Pain., Disp: 28 Tab, Rfl: 0  ALLERGIES: No Known Allergies  SURGHX:   Past Surgical History   Procedure Laterality Date   • Mary by laparoscopy N/A 3/17/2016     Procedure: MARY BY LAPAROSCOPY;  Surgeon: Danyel Seymour M.D.;  Location: SURGERY [facility];  Service:      SOCHX:  reports that he has been smoking Cigarettes.  He does not have any smokeless tobacco history on file. He reports that he drinks alcohol.  FH: family history is not on file.       Objective:     /78 mmHg  Pulse 94  Temp(Src) 36.7 °C (98 °F)  Resp 14  Ht 1.626 m (5' 4.02\")  Wt " 83.915 kg (185 lb)  BMI 31.74 kg/m2  SpO2 95%     Physical Exam   Constitutional: He appears well-developed.   Cardiovascular: Normal rate.    Pulmonary/Chest: Effort normal.   Skin: Skin is warm and dry.   Psychiatric: He has a normal mood and affect. His behavior is normal.       Left Shoulder: No gross deformity. Tenderness palpation anterior GH and lateral shoulder. Decreased range of motion globally especially with flexion 40°, abduction 40°.  strength intact. Reflexes 2+/4.       Assessment/Plan:     1. Glenoid fracture of shoulder, left, with routine healing, subsequent encounter  Continue use sling most the time, use at work, may take off for short periods of time while at home  Begin pendulum and range of motion exercises as tolerated  Continue ibuprofen as needed  May begin unrestricted work  Follow-up one week

## 2017-02-07 ENCOUNTER — OCCUPATIONAL MEDICINE (OUTPATIENT)
Dept: OCCUPATIONAL MEDICINE | Facility: CLINIC | Age: 52
End: 2017-02-07
Payer: COMMERCIAL

## 2017-02-07 VITALS
WEIGHT: 185 LBS | HEIGHT: 64 IN | DIASTOLIC BLOOD PRESSURE: 98 MMHG | TEMPERATURE: 98.1 F | HEART RATE: 90 BPM | SYSTOLIC BLOOD PRESSURE: 160 MMHG | RESPIRATION RATE: 16 BRPM | BODY MASS INDEX: 31.58 KG/M2 | OXYGEN SATURATION: 94 %

## 2017-02-07 DIAGNOSIS — S42.142D: ICD-10-CM

## 2017-02-07 DIAGNOSIS — S42.152D: ICD-10-CM

## 2017-02-07 PROCEDURE — 99213 OFFICE O/P EST LOW 20 MIN: CPT | Performed by: PREVENTIVE MEDICINE

## 2017-02-07 RX ORDER — IBUPROFEN 600 MG/1
600 TABLET ORAL EVERY 6 HOURS PRN
Qty: 60 TAB | Refills: 0 | Status: SHIPPED | OUTPATIENT
Start: 2017-02-07 | End: 2017-03-21 | Stop reason: SDUPTHER

## 2017-02-07 NOTE — MR AVS SNAPSHOT
"        Venancio BooneOdalis   2017 4:00 PM   Occupational Medicine   MRN: [id number]    Department:  [facility]   Dept Phone:  [phone]    Description:  Male : 1965   Provider:  Toi Campbell D.O.           Reason for Visit     Follow-Up Lt shoulder feeling better      Allergies as of 2017     No Known Allergies      You were diagnosed with     Glenoid fracture of shoulder, left, with routine healing, subsequent encounter   [[id number]]         Vital Signs     Blood Pressure Pulse Temperature Respirations Height Weight    160/98 mmHg 90 36.7 °C (98.1 °F) 16 1.626 m (5' 4\") 83.915 kg (185 lb)    Body Mass Index Oxygen Saturation Smoking Status             31.74 kg/m2 94% Current Some Day Smoker         Basic Information     Date Of Birth Sex Race Ethnicity Preferred Language    1965 Male Patient Refused  Origin (Danish,Ivorian,Indian,Michael, etc) English      Your appointments     2017  4:40 PM   Workers Compensation with Toi Campbell D.O.   [address] [address]   [phone]              Health Maintenance        Date Due Completion Dates    IMM DTaP/Tdap/Td Vaccine (1 - Tdap) 1985 ---    COLONOSCOPY 2016 ---    IMM INFLUENZA (1) 2016 ---            Current Immunizations     No immunizations on file.      Below and/or attached are the medications your provider expects you to take. Review all of your home medications and newly ordered medications with your provider and/or pharmacist. Follow medication instructions as directed by your provider and/or pharmacist. Please keep your medication list with you and share with your provider. Update the information when medications are discontinued, doses are changed, or new medications (including over-the-counter products) are added; and carry medication information at all times in the event of emergency situations     Allergies:  No Known " Allergies          Medications  Valid as of: February 07, 2017 -  4:34 PM    Generic Name Brand Name Tablet Size Instructions for use    Hydrocodone-Acetaminophen (Tab) NORCO 5-325 MG Take 1 Tab by mouth every four hours as needed for up to 9 doses.        Ibuprofen (Tab) MOTRIN 600 MG Take 1 Tab by mouth every 6 hours as needed.        Lisinopril (Tab) PRINIVIL 10 MG Take 1 Tab by mouth every day.        MetFORMIN HCl (Tab) GLUCOPHAGE 500 MG Take 1 Tab by mouth 2 times a day, with meals.        Oxycodone-Acetaminophen (Tab) PERCOCET 5-325 MG Take 1-2 Tabs by mouth every four hours as needed for Moderate Pain.        .                 Medicines prescribed today were sent to:     [id number] - [facility] - [address]    [address] [facility] [address]    Phone: [phone] Fax: [phone]    Open 24 Hours?: No      Medication refill instructions:       If your prescription bottle indicates you have medication refills left, it is not necessary to call your provider’s office. Please contact your pharmacy and they will refill your medication.    If your prescription bottle indicates you do not have any refills left, you may request refills at any time through one of the following ways: The online [email or web address] system (except Urgent Care), by calling your provider’s office, or by asking your pharmacy to contact your provider’s office with a refill request. Medication refills are processed only during regular business hours and may not be available until the next business day. Your provider may request additional information or to have a follow-up visit with you prior to refilling your medication.   *Please Note: Medication refills are assigned a new Rx number when refilled electronically. Your pharmacy may indicate that no refills were authorized even though a new prescription for the same medication is available at the pharmacy. Please request the medicine by name with the pharmacy before  contacting your provider for a refill.           [employer] Access Code: [email or web address]  Expires: 2/26/2017 10:44 PM    [employer]  A secure, online tool to manage your health information     [employer]® is a secure, online tool that connects you to your personalized health information from the privacy of your home -- day or night - making it very easy for you to manage your healthcare. Once the activation process is completed, you can even access your medical information using the [employer] finn, which is available for free in the Apple Finn store or Google Play store.     [employer] provides the following levels of access (as shown below):   My Chart Features   [facility] Primary Care Doctor [facility]  Specialists [facility]  Urgent  Care Non-[facility]  Primary Care  Doctor   Email your healthcare team securely and privately 24/7 X X X    Manage appointments: schedule your next appointment; view details of past/upcoming appointments X      Request prescription refills. X      View recent personal medical records, including lab and immunizations X X X X   View health record, including health history, allergies, medications X X X X   Read reports about your outpatient visits, procedures, consult and ER notes X X X X   See your discharge summary, which is a recap of your hospital and/or ER visit that includes your diagnosis, lab results, and care plan. X X       How to register for [employer]:  1. Go to  [email or web address].  2. Click on the Sign Up Now box, which takes you to the New Member Sign Up page. You will need to provide the following information:  a. Enter your [employer] Access Code exactly as it appears at the top of this page. (You will not need to use this code after you’ve completed the sign-up process. If you do not sign up before the expiration date, you must request a new code.)   b. Enter your date of birth.   c. Enter your home email address.   d. Click Submit, and follow the next screen’s  instructions.  3. Create a Briggot ID. This will be your [employer] login ID and cannot be changed, so think of one that is secure and easy to remember.  4. Create a [employer] password. You can change your password at any time.  5. Enter your Password Reset Question and Answer. This can be used at a later time if you forget your password.   6. Enter your e-mail address. This allows you to receive e-mail notifications when new information is available in [employer].  7. Click Sign Up. You can now view your health information.    For assistance activating your [employer] account, call [phone]

## 2017-02-07 NOTE — Clinical Note
"[facility] [place]   [address],   [address] [address]  Phone: [phone] - Fax: [phone]        [facility] Progress Report and Disability Certification  Date of Service: 2/7/2017   No Show:  No  Date / Time of Next Visit: 2/21/2017   Claim Information   Patient Name: Venancio Rosado  Claim Number:     Employer:   *** Date of Injury: 1/27/2017     Insurer / TPA: [employer] *** ID / SSN:     Occupation: finisher *** Diagnosis: The encounter diagnosis was Glenoid fracture of shoulder, left, with routine healing, subsequent encounter.    Medical Information   Related to Industrial Injury? Yes ***   Subjective Complaints:  DOI 1/27/2017: Patient was covering concrete when he tripped and fell, landing on his left shoulder. He heard a \"pop\" when he landed. Seen in ED, XR and CT scan showed inferior glenoid fracture of the left shoulder with 2mm articular gap. Ortho was consulted via phone and recommended sling and follow up with [facility], referral to Ortho if needed. Patient states the pain has been improving. He notes small amount improvement in his range of motion. He states he remains in a sling while outside the home but will occasionally take off the sling. Patient states that his work does not have any job which he can do with just one arm. He takes ibuprofen 600 mg for relief which has been helping. He has been doing the range of motion exercises that were prescribed last visit.   Objective Findings: Left Shoulder: No gross deformity. Tenderness palpation anterior GH and lateral shoulder. Decreased range of motion globally especially with flexion 70°, abduction 50°.  strength intact. Reflexes 2+/4.   Pre-Existing Condition(s):     Assessment:   Condition Same    Status: Additional Care Required  Permanent Disability:No    Plan:      Diagnostics:      Comments:  Continue range of motion stretches  Use sling for comfort  Refill " ibuprofen 600 mg  Continue restricted duty  Follow-up 2 weeks    Disability Information   Status: Released to Restricted Duty    From:  2/7/2017  Through: 2/21/2017 Restrictions are: Temporary   Physical Restrictions   Sitting:    Standing:    Stooping:    Bending:      Squatting:    Walking:    Climbing:    Pushing:      Pulling:    Other:    Reaching Above Shoulder (L):   Reaching Above Shoulder (R):       Reaching Below Shoulder (L):    Reaching Below Shoulder (R):      Not to exceed Weight Limits   Carrying(hrs):   Weight Limit(lb):   Lifting(hrs):   Weight  Limit(lb):     Comments: No use of right arm. Must use sling at work.    Repetitive Actions   Hands: i.e. Fine Manipulations from Grasping:     Feet: i.e. Operating Foot Controls:     Driving / Operate Machinery:     Physician Name: Toi Campbell D.O. Physician Signature: TOI Marrero D.O. e-Signature: Dr. Srini Canela, Medical Director   Clinic Name / Location: [address],   [address] [place] [address] Clinic Phone Number: Dept: [phone]   Appointment Time: 4:00 Pm Visit Start Time: 3:56 PM   Check-In Time:  3:49 Pm Visit Discharge Time:  ***   Original-Treating Physician or Chiropractor    Page 2-Insurer/TPA    Page 3-Employer    Page 4-Employee

## 2017-02-07 NOTE — Clinical Note
"[facility] [place]   [address],   [address] [address]  Phone: [phone] - Fax: [phone]        [facility] Progress Report and Disability Certification  Date of Service: 2/7/2017   No Show:  No  Date / Time of Next Visit: 2/21/2017 @4:40 PM   Claim Information   Patient Name: Venancio Rosado  Claim Number:     Employer:  [employer] Date of Injury: 1/27/2017     Insurer / TPA: [employer]  ID / SSN:     Occupation: finisher  Diagnosis: The encounter diagnosis was Glenoid fracture of shoulder, left, with routine healing, subsequent encounter.    Medical Information   Related to Industrial Injury? Yes    Subjective Complaints:  DOI 1/27/2017: Patient was covering concrete when he tripped and fell, landing on his left shoulder. He heard a \"pop\" when he landed. Seen in ED, XR and CT scan showed inferior glenoid fracture of the left shoulder with 2mm articular gap. Ortho was consulted via phone and recommended sling and follow up with [facility], referral to Ortho if needed. Patient states the pain has been improving. He notes small amount improvement in his range of motion. He states he remains in a sling while outside the home but will occasionally take off the sling. Patient states that his work does not have any job which he can do with just one arm. He takes ibuprofen 600 mg for relief which has been helping. He has been doing the range of motion exercises that were prescribed last visit.   Objective Findings: Left Shoulder: No gross deformity. Tenderness palpation anterior GH and lateral shoulder. Decreased range of motion globally especially with flexion 70°, abduction 50°.  strength intact. Reflexes 2+/4.   Pre-Existing Condition(s):     Assessment:   Condition Same    Status: Additional Care Required  Permanent Disability:No    Plan:      Diagnostics:      Comments:  Continue range of motion stretches  Use sling for " comfort  Refill ibuprofen 600 mg  Continue restricted duty  Follow-up 2 weeks    Disability Information   Status: Released to Restricted Duty    From:  2/7/2017  Through: 2/21/2017 Restrictions are: Temporary   Physical Restrictions   Sitting:    Standing:    Stooping:    Bending:      Squatting:    Walking:    Climbing:    Pushing:      Pulling:    Other:    Reaching Above Shoulder (L):   Reaching Above Shoulder (R):       Reaching Below Shoulder (L):    Reaching Below Shoulder (R):      Not to exceed Weight Limits   Carrying(hrs):   Weight Limit(lb):   Lifting(hrs):   Weight  Limit(lb):     Comments: No use of left arm. Must use sling at work.    Repetitive Actions   Hands: i.e. Fine Manipulations from Grasping:     Feet: i.e. Operating Foot Controls:     Driving / Operate Machinery:     Physician Name: Toi Campbell D.O. Physician Signature: TOI Marrero D.O. e-Signature: Dr. Srini Canela, Medical Director   Clinic Name / Location: [address],   [address] [place] [address] Clinic Phone Number: Dept: [phone]   Appointment Time: 4:00 Pm Visit Start Time: 3:56 PM   Check-In Time:  3:49 Pm Visit Discharge Time:  4:28 PM   Original-Treating Physician or Chiropractor    Page 2-Insurer/TPA    Page 3-Employer    Page 4-Employee

## 2017-02-08 NOTE — PROGRESS NOTES
"Subjective:      Venancio Rosado is a 51 y.o. male who presents with Follow-Up      DOI 1/27/2017: Patient was covering concrete when he tripped and fell, landing on his left shoulder. He heard a \"pop\" when he landed. Seen in ED, XR and CT scan showed inferior glenoid fracture of the left shoulder with 2mm articular gap. Ortho was consulted via phone and recommended sling and follow up with [facility], referral to Ortho if needed. Patient states the pain has been improving. He notes small amount improvement in his range of motion. He states he remains in a sling while outside the home but will occasionally take off the sling. Patient states that his work does not have any job which he can do with just one arm. He takes ibuprofen 600 mg for relief which has been helping. He has been doing the range of motion exercises that were prescribed last visit.     HPI    ROS  PMH:  has a past medical history of Gallstones.  MEDS:   Current outpatient prescriptions:   •  ibuprofen (MOTRIN) 600 MG Tab, Take 1 Tab by mouth every 6 hours as needed., Disp: 60 Tab, Rfl: 0  •  hydrocodone-acetaminophen (NORCO) 5-325 MG Tab per tablet, Take 1 Tab by mouth every four hours as needed for up to 9 doses., Disp: 9 Tab, Rfl: 0  •  metformin (GLUCOPHAGE) 500 MG Tab, Take 1 Tab by mouth 2 times a day, with meals., Disp: 60 Tab, Rfl: 0  •  lisinopril (PRINIVIL) 10 MG Tab, Take 1 Tab by mouth every day., Disp: 30 Tab, Rfl: 0  •  oxycodone-acetaminophen (PERCOCET) 5-325 MG Tab, Take 1-2 Tabs by mouth every four hours as needed for Moderate Pain., Disp: 28 Tab, Rfl: 0  ALLERGIES: No Known Allergies  SURGHX:   Past Surgical History   Procedure Laterality Date   • Mary by laparoscopy N/A 3/17/2016     Procedure: MARY BY LAPAROSCOPY;  Surgeon: Danyel Seymour M.D.;  Location: SURGERY [facility];  Service:      SOCHX:  reports that he has been smoking Cigarettes.  He does not have any smokeless tobacco history on file. He reports that he " "drinks alcohol.  FH: family history is not on file.       Objective:     /98 mmHg  Pulse 90  Temp(Src) 36.7 °C (98.1 °F)  Resp 16  Ht 1.626 m (5' 4\")  Wt 83.915 kg (185 lb)  BMI 31.74 kg/m2  SpO2 94%     Physical Exam   Constitutional: He is oriented to person, place, and time. He appears well-developed and well-nourished.   Cardiovascular: Normal rate.    Pulmonary/Chest: Effort normal.   Neurological: He is alert and oriented to person, place, and time.   Skin: Skin is warm and dry.   Psychiatric: He has a normal mood and affect. His behavior is normal.       Left Shoulder: No gross deformity. Tenderness palpation anterior GH and lateral shoulder. Decreased range of motion globally especially with flexion 70°, abduction 50°.  strength intact. Reflexes 2+/4.       Assessment/Plan:     1. Glenoid fracture of shoulder, left, with routine healing, subsequent encounter  - ibuprofen (MOTRIN) 600 MG Tab; Take 1 Tab by mouth every 6 hours as needed.  Dispense: 60 Tab; Refill: 0    Continue range of motion stretches  Use sling for comfort  Refill ibuprofen 600 mg  Continue restricted duty  Follow-up 2 weeks    "

## 2017-02-21 ENCOUNTER — OCCUPATIONAL MEDICINE (OUTPATIENT)
Dept: OCCUPATIONAL MEDICINE | Facility: CLINIC | Age: 52
End: 2017-02-21
Payer: COMMERCIAL

## 2017-02-21 VITALS
HEIGHT: 64 IN | TEMPERATURE: 97.9 F | BODY MASS INDEX: 31.58 KG/M2 | OXYGEN SATURATION: 94 % | HEART RATE: 87 BPM | RESPIRATION RATE: 14 BRPM | WEIGHT: 185 LBS | DIASTOLIC BLOOD PRESSURE: 78 MMHG | SYSTOLIC BLOOD PRESSURE: 146 MMHG

## 2017-02-21 DIAGNOSIS — S42.152D: ICD-10-CM

## 2017-02-21 DIAGNOSIS — S42.142D: ICD-10-CM

## 2017-02-21 PROCEDURE — 99204 OFFICE O/P NEW MOD 45 MIN: CPT | Performed by: PREVENTIVE MEDICINE

## 2017-02-21 NOTE — Clinical Note
"[address],   [address] [address]  Phone: [phone] - Fax: [phone]        [facility] Progress Report and Disability Certification  Date of Service: 2/21/2017   No Show:  No  Date / Time of Next Visit: 3/7/2017@2:20 PM   Claim Information2:   Patient Name: Venancio Rosado  Claim Number:     Employer:   [employer] Date of Injury: 1/27/2017     Insurer / TPA: [employer]  ID / SSN:     Occupation: finisher  Diagnosis: The encounter diagnosis was Glenoid fracture of shoulder, left, with routine healing, subsequent encounter.    Medical Information   Related to Industrial Injury? Yes    Subjective Complaints:  DOI 1/27/2017: Patient was covering concrete when he tripped and fell, landing on his left shoulder. He heard a \"pop\" when he landed. Seen in ED, XR and CT scan showed inferior glenoid fracture of the left shoulder with 2mm articular gap. Ortho was consulted via phone and recommended sling and follow up with [facility], referral to Ortho if needed. Patient states that his pain has been improving. He states that his range of motion is improving as well he has been doing the pendulum exercises as prescribed. He states that his arm is sore at the end of those but they have been helping. He takes ibuprofen 6 mg for relief needed. He is not using the sling quite as much as before.   Objective Findings: Left Shoulder: No gross deformity. Tenderness palpation anterior GH and lateral shoulder. Decreased range of motion globally especially with flexion 120°, abduction 90°.  strength intact. Reflexes 2+/4.   Pre-Existing Condition(s):     Assessment:   Condition Improved    Status: Additional Care Required  Permanent Disability:No    Plan:      Diagnostics:      Comments:  Continue to use sling as needed  Continue range of motion exercises  Referral to PT, do not begin until after next visit  Restricted duty, " follow up 2 weeks    Disability Information   Status: Released to Restricted Duty    From:  2/21/2017  Through: 3/7/2017 Restrictions are: Temporary   Physical Restrictions   Sitting:    Standing:    Stooping:    Bending:      Squatting:    Walking:    Climbing:    Pushing:      Pulling:    Other:    Reaching Above Shoulder (L):   Reaching Above Shoulder (R):       Reaching Below Shoulder (L):    Reaching Below Shoulder (R):      Not to exceed Weight Limits   Carrying(hrs):   Weight Limit(lb):   Lifting(hrs):   Weight  Limit(lb):     Comments: Minimal use of left arm. Must use sling at work    Repetitive Actions   Hands: i.e. Fine Manipulations from Grasping:     Feet: i.e. Operating Foot Controls:     Driving / Operate Machinery:     Physician Name: Toi Campbell D.O. Physician Signature: TOI Marrero D.O. e-Signature: Dr. Srini Canela, Medical Director   Clinic Name / Location: [address],   [address] [place] [address] Clinic Phone Number: Dept: [phone]   Appointment Time: 4:40 Pm Visit Start Time: 4:41 PM   Check-In Time:  4:35 Pm Visit Discharge Time:  5:25 PM   Original-Treating Physician or Chiropractor    Page 2-Insurer/TPA    Page 3-Employer    Page 4-Employee

## 2017-02-21 NOTE — MR AVS SNAPSHOT
"        Venancio Rosado   2017 4:40 PM   Occupational Medicine   MRN: [id number]    Department:  [facility]   Dept Phone:  [phone]    Description:  Male : 1965   Provider:  Toi Campbell D.O.           Reason for Visit     Follow-Up WC FV (L) shoulder, pt states he is getting better but there is still pain with movement       Allergies as of 2017     No Known Allergies      You were diagnosed with     Glenoid fracture of shoulder, left, with routine healing, subsequent encounter   [[id number]]         Vital Signs     Blood Pressure Pulse Temperature Respirations Height Weight    146/78 mmHg 87 36.6 °C (97.9 °F) 14 1.626 m (5' 4\") 83.915 kg (185 lb)    Body Mass Index Oxygen Saturation Smoking Status             31.74 kg/m2 94% Current Some Day Smoker         Basic Information     Date Of Birth Sex Race Ethnicity Preferred Language    1965 Male Patient Refused  Origin (Wolof,Iraqi,Ukrainian,Eritrean, etc) English      Health Maintenance        Date Due Completion Dates    IMM DTaP/Tdap/Td Vaccine (1 - Tdap) 1985 ---    COLONOSCOPY 2016 ---    IMM INFLUENZA (1) 2016 ---            Current Immunizations     No immunizations on file.      Below and/or attached are the medications your provider expects you to take. Review all of your home medications and newly ordered medications with your provider and/or pharmacist. Follow medication instructions as directed by your provider and/or pharmacist. Please keep your medication list with you and share with your provider. Update the information when medications are discontinued, doses are changed, or new medications (including over-the-counter products) are added; and carry medication information at all times in the event of emergency situations     Allergies:  No Known Allergies          Medications  Valid as of: 2017 -  5:01 PM    Generic Name Brand Name Tablet Size Instructions for use   " Hydrocodone-Acetaminophen (Tab) NORCO 5-325 MG Take 1 Tab by mouth every four hours as needed for up to 9 doses.        Ibuprofen (Tab) MOTRIN 600 MG Take 1 Tab by mouth every 6 hours as needed.        Lisinopril (Tab) PRINIVIL 10 MG Take 1 Tab by mouth every day.        MetFORMIN HCl (Tab) GLUCOPHAGE 500 MG Take 1 Tab by mouth 2 times a day, with meals.        Oxycodone-Acetaminophen (Tab) PERCOCET 5-325 MG Take 1-2 Tabs by mouth every four hours as needed for Moderate Pain.        .                 Medicines prescribed today were sent to:     [facility]/PHARMACY #[id number] - [place], [place] - [address]    [address] [address]    Phone: [phone] Fax: [phone]    Open 24 Hours?: No      Medication refill instructions:       If your prescription bottle indicates you have medication refills left, it is not necessary to call your provider’s office. Please contact your pharmacy and they will refill your medication.    If your prescription bottle indicates you do not have any refills left, you may request refills at any time through one of the following ways: The online [employer] Multimedia system (except Urgent Care), by calling your provider’s office, or by asking your pharmacy to contact your provider’s office with a refill request. Medication refills are processed only during regular business hours and may not be available until the next business day. Your provider may request additional information or to have a follow-up visit with you prior to refilling your medication.   *Please Note: Medication refills are assigned a new Rx number when refilled electronically. Your pharmacy may indicate that no refills were authorized even though a new prescription for the same medication is available at the pharmacy. Please request the medicine by name with the pharmacy before contacting your provider for a refill.        Your To Do List     Future Labs/Procedures Complete By Expires    CT-SHOULDER W/O LEFT  As  directed 2/21/2018      Referral     A referral request has been sent to our patient care coordination department. Please allow 3-5 business days for us to process this request and contact you either by phone or mail. If you do not hear from us by the 5th business day, please call us at [phone].           Benhauer Access Code: [email or web address]  Expires: 2/26/2017 10:44 PM    Benhauer  A secure, online tool to manage your health information     [employer]’s Benhauer® is a secure, online tool that connects you to your personalized health information from the privacy of your home -- day or night - making it very easy for you to manage your healthcare. Once the activation process is completed, you can even access your medical information using the Benhauer finn, which is available for free in the Apple Finn store or Google Play store.     Benhauer provides the following levels of access (as shown below):   My Chart Features   [facility] Primary Care Doctor [facility]  Specialists [facility]  Urgent  Care Non-[facility]  Primary Care  Doctor   Email your healthcare team securely and privately 24/7 X X X    Manage appointments: schedule your next appointment; view details of past/upcoming appointments X      Request prescription refills. X      View recent personal medical records, including lab and immunizations X X X X   View health record, including health history, allergies, medications X X X X   Read reports about your outpatient visits, procedures, consult and ER notes X X X X   See your discharge summary, which is a recap of your hospital and/or ER visit that includes your diagnosis, lab results, and care plan. X X       How to register for Benhauer:  1. Go to  [email or web address].  2. Click on the Sign Up Now box, which takes you to the New Member Sign Up page. You will need to provide the following information:  a. Enter your Benhauer Access Code exactly as it appears at the top of this page. (You will not need to use  this code after you’ve completed the sign-up process. If you do not sign up before the expiration date, you must request a new code.)   b. Enter your date of birth.   c. Enter your home email address.   d. Click Submit, and follow the next screen’s instructions.  3. Create a MoneyExpertt ID. This will be your MoneyExpertt login ID and cannot be changed, so think of one that is secure and easy to remember.  4. Create a MoneyExpertt password. You can change your password at any time.  5. Enter your Password Reset Question and Answer. This can be used at a later time if you forget your password.   6. Enter your e-mail address. This allows you to receive e-mail notifications when new information is available in Oculus VR.  7. Click Sign Up. You can now view your health information.    For assistance activating your Oculus VR account, call [phone]

## 2017-02-22 NOTE — PROGRESS NOTES
"Subjective:      Venancio Rosado is a 51 y.o. male who presents with Follow-Up      DOI 1/27/2017: Patient was covering concrete when he tripped and fell, landing on his left shoulder. He heard a \"pop\" when he landed. Seen in ED, XR and CT scan showed inferior glenoid fracture of the left shoulder with 2mm articular gap. Ortho was consulted via phone and recommended sling and follow up with [facility], referral to Ortho if needed. Patient states that his pain has been improving. He states that his range of motion is improving as well he has been doing the pendulum exercises as prescribed. He states that his arm is sore at the end of those but they have been helping. He takes ibuprofen 6 mg for relief needed. He is not using the sling quite as much as before.     HPI    ROS  PMH:  has a past medical history of Gallstones.  MEDS:   Current outpatient prescriptions:   •  ibuprofen (MOTRIN) 600 MG Tab, Take 1 Tab by mouth every 6 hours as needed., Disp: 60 Tab, Rfl: 0  •  hydrocodone-acetaminophen (NORCO) 5-325 MG Tab per tablet, Take 1 Tab by mouth every four hours as needed for up to 9 doses., Disp: 9 Tab, Rfl: 0  •  metformin (GLUCOPHAGE) 500 MG Tab, Take 1 Tab by mouth 2 times a day, with meals., Disp: 60 Tab, Rfl: 0  •  lisinopril (PRINIVIL) 10 MG Tab, Take 1 Tab by mouth every day., Disp: 30 Tab, Rfl: 0  •  oxycodone-acetaminophen (PERCOCET) 5-325 MG Tab, Take 1-2 Tabs by mouth every four hours as needed for Moderate Pain., Disp: 28 Tab, Rfl: 0  ALLERGIES: No Known Allergies  SURGHX:   Past Surgical History   Procedure Laterality Date   • Mary by laparoscopy N/A 3/17/2016     Procedure: MARY BY LAPAROSCOPY;  Surgeon: Danyel Seymour M.D.;  Location: SURGERY [facility];  Service:      SOCHX:  reports that he has been smoking Cigarettes.  He does not have any smokeless tobacco history on file. He reports that he drinks alcohol.  FH: family history is not on file.       Objective:     /78 mmHg  " "Pulse 87  Temp(Src) 36.6 °C (97.9 °F)  Resp 14  Ht 1.626 m (5' 4\")  Wt 83.915 kg (185 lb)  BMI 31.74 kg/m2  SpO2 94%     Physical Exam   Constitutional: He appears well-developed and well-nourished.   Cardiovascular: Normal rate.    Pulmonary/Chest: Effort normal.   Skin: Skin is warm and dry.   Psychiatric: He has a normal mood and affect. His behavior is normal.       Left Shoulder: No gross deformity. Tenderness palpation anterior GH and lateral shoulder. Decreased range of motion globally especially with flexion 120°, abduction 90°.  strength intact. Reflexes 2+/4.       Assessment/Plan:     1. Glenoid fracture of shoulder, left, with routine healing, subsequent encounter  - REFERRAL TO RADIOLOGY  - CT-SHOULDER W/O LEFT; Future  - REFERRAL TO PHYSICAL THERAPY Reason for Therapy: Eval/Treat/Report    Continue to use sling as needed  Continue range of motion exercises  Referral to PT, do not begin until after next visit  Restricted duty, follow up 2 weeks      "

## 2017-03-03 ENCOUNTER — HOSPITAL ENCOUNTER (OUTPATIENT)
Dept: RADIOLOGY | Facility: MEDICAL CENTER | Age: 52
End: 2017-03-03
Attending: PREVENTIVE MEDICINE
Payer: COMMERCIAL

## 2017-03-03 DIAGNOSIS — S42.142D: ICD-10-CM

## 2017-03-03 DIAGNOSIS — S42.152D: ICD-10-CM

## 2017-03-03 PROCEDURE — 73200 CT UPPER EXTREMITY W/O DYE: CPT | Mod: LT

## 2017-03-07 ENCOUNTER — OCCUPATIONAL MEDICINE (OUTPATIENT)
Dept: OCCUPATIONAL MEDICINE | Facility: CLINIC | Age: 52
End: 2017-03-07
Payer: COMMERCIAL

## 2017-03-07 VITALS
DIASTOLIC BLOOD PRESSURE: 104 MMHG | BODY MASS INDEX: 31.58 KG/M2 | WEIGHT: 185 LBS | HEART RATE: 88 BPM | OXYGEN SATURATION: 100 % | SYSTOLIC BLOOD PRESSURE: 162 MMHG | HEIGHT: 64 IN | TEMPERATURE: 97.5 F | RESPIRATION RATE: 14 BRPM

## 2017-03-07 DIAGNOSIS — S42.142D: ICD-10-CM

## 2017-03-07 DIAGNOSIS — S42.152D: ICD-10-CM

## 2017-03-07 PROCEDURE — 99212 OFFICE O/P EST SF 10 MIN: CPT | Performed by: PREVENTIVE MEDICINE

## 2017-03-07 ASSESSMENT — PAIN SCALES - GENERAL: PAINLEVEL: 5=MODERATE PAIN

## 2017-03-07 NOTE — MR AVS SNAPSHOT
"        Venancio BooneOdalis   3/7/2017 2:20 PM   Occupational Medicine   MRN: [id number]    Department:  [facility]   Dept Phone:  [phone]    Description:  Male : 1965   Provider:  Toi Campbell D.O.           Reason for Visit     Follow-Up WC DOI 2017 - L Shoulder - Better - Room 3      Allergies as of 3/7/2017     No Known Allergies      You were diagnosed with     Glenoid fracture of shoulder, left, with routine healing, subsequent encounter   [[id number]]         Vital Signs     Blood Pressure Pulse Temperature Respirations Height Weight    162/104 mmHg 88 36.4 °C (97.5 °F) 14 1.626 m (5' 4.02\") 83.915 kg (185 lb)    Body Mass Index Oxygen Saturation Smoking Status             31.74 kg/m2 100% Current Some Day Smoker         Basic Information     Date Of Birth Sex Race Ethnicity Preferred Language    1965 Male Patient Refused  Origin (Canadian,Portuguese,Djiboutian,Prydeinig, etc) English      Health Maintenance        Date Due Completion Dates    IMM DTaP/Tdap/Td Vaccine (1 - Tdap) 1985 ---    COLONOSCOPY 2016 ---    IMM INFLUENZA (1) 2016 ---            Current Immunizations     No immunizations on file.      Below and/or attached are the medications your provider expects you to take. Review all of your home medications and newly ordered medications with your provider and/or pharmacist. Follow medication instructions as directed by your provider and/or pharmacist. Please keep your medication list with you and share with your provider. Update the information when medications are discontinued, doses are changed, or new medications (including over-the-counter products) are added; and carry medication information at all times in the event of emergency situations     Allergies:  No Known Allergies          Medications  Valid as of: 2017 -  2:23 PM    Generic Name Brand Name Tablet Size Instructions for use    Hydrocodone-Acetaminophen (Tab) NORCO 5-325 MG Take " 1 Tab by mouth every four hours as needed for up to 9 doses.        Ibuprofen (Tab) MOTRIN 600 MG Take 1 Tab by mouth every 6 hours as needed.        Lisinopril (Tab) PRINIVIL 10 MG Take 1 Tab by mouth every day.        MetFORMIN HCl (Tab) GLUCOPHAGE 500 MG Take 1 Tab by mouth 2 times a day, with meals.        Oxycodone-Acetaminophen (Tab) PERCOCET 5-325 MG Take 1-2 Tabs by mouth every four hours as needed for Moderate Pain.        .                 Medicines prescribed today were sent to:     [facility]/PHARMACY #[id number] - [facility] [place] - [address]    [address] [address]    Phone: [phone] Fax: [phone]    Open 24 Hours?: No      Medication refill instructions:       If your prescription bottle indicates you have medication refills left, it is not necessary to call your provider’s office. Please contact your pharmacy and they will refill your medication.    If your prescription bottle indicates you do not have any refills left, you may request refills at any time through one of the following ways: The online XYZE system (except Urgent Care), by calling your provider’s office, or by asking your pharmacy to contact your provider’s office with a refill request. Medication refills are processed only during regular business hours and may not be available until the next business day. Your provider may request additional information or to have a follow-up visit with you prior to refilling your medication.   *Please Note: Medication refills are assigned a new Rx number when refilled electronically. Your pharmacy may indicate that no refills were authorized even though a new prescription for the same medication is available at the pharmacy. Please request the medicine by name with the pharmacy before contacting your provider for a refill.           Meet Youhart Status: Patient Declined

## 2017-03-07 NOTE — PROGRESS NOTES
"Subjective:      Venancio Rosado is a 51 y.o. male who presents with Follow-Up      DOI 1/27/2017: Patient was covering concrete when he tripped and fell, landing on his left shoulder. He heard a \"pop\" when he landed. Seen in ED, XR and CT scan showed inferior glenoid fracture of the left shoulder with 2mm articular gap. Patient states that the pain continues to improve. He states that he continues to have more range of motion than before. He states that physical therapy was approved but has not had some appointment. Denies any radiating pain, numbness or tingling.     HPI    ROS    PMH:  has a past medical history of Gallstones.  MEDS:   Current outpatient prescriptions:   •  ibuprofen (MOTRIN) 600 MG Tab, Take 1 Tab by mouth every 6 hours as needed., Disp: 60 Tab, Rfl: 0  •  hydrocodone-acetaminophen (NORCO) 5-325 MG Tab per tablet, Take 1 Tab by mouth every four hours as needed for up to 9 doses., Disp: 9 Tab, Rfl: 0  •  metformin (GLUCOPHAGE) 500 MG Tab, Take 1 Tab by mouth 2 times a day, with meals., Disp: 60 Tab, Rfl: 0  •  lisinopril (PRINIVIL) 10 MG Tab, Take 1 Tab by mouth every day., Disp: 30 Tab, Rfl: 0  •  oxycodone-acetaminophen (PERCOCET) 5-325 MG Tab, Take 1-2 Tabs by mouth every four hours as needed for Moderate Pain., Disp: 28 Tab, Rfl: 0  ALLERGIES: No Known Allergies  SURGHX:   Past Surgical History   Procedure Laterality Date   • Mary by laparoscopy N/A 3/17/2016     Procedure: MARY BY LAPAROSCOPY;  Surgeon: Danyel Seymour M.D.;  Location: SURGERY [facility];  Service:      SOCHX:  reports that he has been smoking Cigarettes.  He does not have any smokeless tobacco history on file. He reports that he drinks alcohol.  FH: family history is not on file.     Objective:     /104 mmHg  Pulse 88  Temp(Src) 36.4 °C (97.5 °F)  Resp 14  Ht 1.626 m (5' 4.02\")  Wt 83.915 kg (185 lb)  BMI 31.74 kg/m2  SpO2 100%     Physical Exam   Constitutional: He appears well-developed and " well-nourished.   Cardiovascular: Normal rate.    Pulmonary/Chest: Effort normal.   Skin: Skin is warm and dry.   Psychiatric: He has a normal mood and affect. His behavior is normal.   Vitals reviewed.      Left Shoulder: No gross deformity. Tenderness palpation anterior GH and lateral shoulder. Decreased range of motion with flexion 160°, abduction 120°.  strength intact. Reflexes 2+/4.       Assessment/Plan:     1. Glenoid fracture of shoulder, left, with routine healing, subsequent encounter  CT Left Shoulder: Anterior-inferior glenoid fracture with 1.9mm articular gap. Evidence of fracture healing.  Begin physical therapy  Lessen restrictions, may begin lifting and using right arm  OTC Ibuprofen as needed  Follow up 2 weeks  See PCP regarding elevated blood pressure

## 2017-03-07 NOTE — Clinical Note
"[facility]   [address] [place] [address],   [address] [address]  Phone: [phone] - Fax: [phone]        [facility] Progress Report and Disability Certification  Date of Service: 3/7/2017   No Show:  No  Date / Time of Next Visit: 3/21/2017@2:20PM   Claim Information   Patient Name: Venancio Rosado  Claim Number:     Employer:   [employer]  Date of Injury: 1/27/2017     Insurer / TPA: [employer]  ID / SSN:     Occupation: finisher  Diagnosis: The encounter diagnosis was Glenoid fracture of shoulder, left, with routine healing, subsequent encounter.    Medical Information   Related to Industrial Injury? Yes    Subjective Complaints:  DOI 1/27/2017: Patient was covering concrete when he tripped and fell, landing on his left shoulder. He heard a \"pop\" when he landed. Seen in ED, XR and CT scan showed inferior glenoid fracture of the left shoulder with 2mm articular gap. Patient states that the pain continues to improve. He states that he continues to have more range of motion than before. He states that physical therapy was approved but has not had some appointment. Denies any radiating pain, numbness or tingling.   Objective Findings: Left Shoulder: No gross deformity. Tenderness palpation anterior GH and lateral shoulder. Decreased range of motion with flexion 160°, abduction 120°.  strength intact. Reflexes 2+/4.   Pre-Existing Condition(s):     Assessment:   Condition Improved    Status: Additional Care Required  Permanent Disability:No    Plan:      Diagnostics:      Comments:  CT Left Shoulder: Anterior-inferior glenoid fracture with 1.9mm articular gap. Evidence of fracture healing.  Begin physical therapy  Lessen restrictions, may begin lifting and using right arm  OTC Ibuprofen as needed  Follow up 2 weeks    Disability Information   Status: Released to Restricted Duty    From:  3/7/2017  Through: 3/21/2017 Restrictions are: " Temporary   Physical Restrictions   Sitting:    Standing:    Stooping:    Bending:      Squatting:    Walking:    Climbing:    Pushing:      Pulling:    Other:    Reaching Above Shoulder (L): < or = to 2 hrs/day Reaching Above Shoulder (R):       Reaching Below Shoulder (L):    Reaching Below Shoulder (R):      Not to exceed Weight Limits   Carrying(hrs):   Weight Limit(lb): < or = to 25 pounds Lifting(hrs):   Weight  Limit(lb): < or = to 25 pounds   Comments:      Repetitive Actions   Hands: i.e. Fine Manipulations from Grasping:     Feet: i.e. Operating Foot Controls:     Driving / Operate Machinery:     Physician Name: Toi Campbell D.O. Physician Signature: TOI Marrero D.O. e-Signature: Dr. Srini Canela, Medical Director   Clinic Name / Location: [address],   [address] [place] [id number] Clinic Phone Number: Dept: [phone]   Appointment Time: 2:20 Pm Visit Start Time: 2:11 PM   Check-In Time:  2:06 Pm Visit Discharge Time:  @2:30PM    Original-Treating Physician or Chiropractor    Page 2-Insurer/TPA    Page 3-Employer    Page 4-Employee

## 2017-03-21 ENCOUNTER — OCCUPATIONAL MEDICINE (OUTPATIENT)
Dept: OCCUPATIONAL MEDICINE | Facility: CLINIC | Age: 52
End: 2017-03-21
Payer: COMMERCIAL

## 2017-03-21 VITALS
RESPIRATION RATE: 16 BRPM | HEART RATE: 78 BPM | WEIGHT: 177 LBS | DIASTOLIC BLOOD PRESSURE: 88 MMHG | TEMPERATURE: 98 F | SYSTOLIC BLOOD PRESSURE: 140 MMHG | HEIGHT: 64 IN | OXYGEN SATURATION: 94 % | BODY MASS INDEX: 30.22 KG/M2

## 2017-03-21 DIAGNOSIS — S42.152D: ICD-10-CM

## 2017-03-21 DIAGNOSIS — S42.142D: ICD-10-CM

## 2017-03-21 PROCEDURE — 99213 OFFICE O/P EST LOW 20 MIN: CPT | Performed by: PREVENTIVE MEDICINE

## 2017-03-21 RX ORDER — IBUPROFEN 600 MG/1
600 TABLET ORAL EVERY 6 HOURS PRN
Qty: 60 TAB | Refills: 0 | Status: SHIPPED | OUTPATIENT
Start: 2017-03-21

## 2017-03-21 ASSESSMENT — ENCOUNTER SYMPTOMS
CONSTITUTIONAL NEGATIVE: 1
NEUROLOGICAL NEGATIVE: 1
GASTROINTESTINAL NEGATIVE: 1

## 2017-03-21 ASSESSMENT — PAIN SCALES - GENERAL: PAINLEVEL: 5=MODERATE PAIN

## 2017-03-21 NOTE — Clinical Note
"[facility]   [address],   [address] [address]  Phone: [phone] - Fax: [phone]        [facility] Progress Report and Disability Certification  Date of Service: 3/21/2017   No Show:  No  Date / Time of Next Visit: 4/4/2017@2:00PM    Claim Information   Patient Name: Venancio Rosado  Claim Number:     Employer:   [employer]    Date of Injury: 1/27/2017     Insurer / TPA: [employer]  ID / SSN:     Occupation: finisher  Diagnosis: The encounter diagnosis was Glenoid fracture of shoulder, left, with routine healing, subsequent encounter.    Medical Information   Related to Industrial Injury? Yes    Subjective Complaints:  DOI 1/27/2017: Patient was covering concrete when he tripped and fell, landing on his left shoulder. He heard a \"pop\" when he landed. Seen in ED, XR and CT scan showed inferior glenoid fracture of the left shoulder with 2mm articular gap. Repeat CT showed evidence of healing and no displacement. Patient has noted some improvement in range of motion and pain. I will restart continue cefepime especially with external and internal rotation movement shoulder. Continues to have difficulty also with abduction. Takes ibuprofen for relief. He states he called physical therapy they do not have an appointment until April 5.   Objective Findings: Left Shoulder: No gross deformity. Tenderness palpation anterior GH and lateral shoulder. Decreased range of motion with flexion 170°, abduction 120°.  strength intact. Reflexes 2+/4.   Pre-Existing Condition(s):     Assessment:   Condition Same    Status: Additional Care Required  Permanent Disability:No    Plan:      Diagnostics:      Comments:  Call case  and try to get seen at another clinic  Ibuprofen 600 mg as needed for pain  Continue restricted duty  Follow-up 2 weeks      Disability Information   Status: Released to Restricted Duty    From:  " 3/21/2017  Through: 4/4/2017 Restrictions are: Temporary   Physical Restrictions   Sitting:    Standing:    Stooping:    Bending:      Squatting:    Walking:    Climbing:    Pushing:      Pulling:    Other:    Reaching Above Shoulder (L):   Reaching Above Shoulder (R): < or = 2 hrs/day     Reaching Below Shoulder (L):    Reaching Below Shoulder (R):      Not to exceed Weight Limits   Carrying(hrs):   Weight Limit(lb): < or = to 25 pounds Lifting(hrs):   Weight  Limit(lb): < or = to 25 pounds   Comments:      Repetitive Actions   Hands: i.e. Fine Manipulations from Grasping:     Feet: i.e. Operating Foot Controls:     Driving / Operate Machinery:     Physician Name: Toi Campbell D.O. Physician Signature: TOI Marrero D.O. e-Signature: Dr. Srini Canela, Medical Director   Clinic Name / Location: [address],   [address] [place] [address] Clinic Phone Number: Dept: [phone]   Appointment Time: 2:20 Pm Visit Start Time: 2:18 PM   Check-In Time:  2:06 Pm Visit Discharge Time:  @2:50PM   Original-Treating Physician or Chiropractor    Page 2-Insurer/TPA    Page 3-Employer    Page 4-Employee

## 2017-03-21 NOTE — PROGRESS NOTES
"Subjective:      Venancio Rosado is a 51 y.o. male who presents with Work-Related Injury      DOI 1/27/2017: Patient was covering concrete when he tripped and fell, landing on his left shoulder. He heard a \"pop\" when he landed. Seen in ED, XR and CT scan showed inferior glenoid fracture of the left shoulder with 2mm articular gap. Repeat CT showed evidence of healing and no displacement. Patient has noted some improvement in range of motion and pain. I will restart continue cefepime especially with external and internal rotation movement shoulder. Continues to have difficulty also with abduction. Takes ibuprofen for relief. He states he called physical therapy they do not have an appointment until April 5.     HPI    Review of Systems   Constitutional: Negative.    HENT: Negative.    Gastrointestinal: Negative.    Musculoskeletal: Positive for joint pain.   Neurological: Negative.      PMH:  has a past medical history of Gallstones.  MEDS:   Current outpatient prescriptions:   •  ibuprofen (MOTRIN) 600 MG Tab, Take 1 Tab by mouth every 6 hours as needed., Disp: 60 Tab, Rfl: 0  •  hydrocodone-acetaminophen (NORCO) 5-325 MG Tab per tablet, Take 1 Tab by mouth every four hours as needed for up to 9 doses., Disp: 9 Tab, Rfl: 0  •  metformin (GLUCOPHAGE) 500 MG Tab, Take 1 Tab by mouth 2 times a day, with meals., Disp: 60 Tab, Rfl: 0  •  lisinopril (PRINIVIL) 10 MG Tab, Take 1 Tab by mouth every day., Disp: 30 Tab, Rfl: 0  •  oxycodone-acetaminophen (PERCOCET) 5-325 MG Tab, Take 1-2 Tabs by mouth every four hours as needed for Moderate Pain., Disp: 28 Tab, Rfl: 0  ALLERGIES: No Known Allergies  SURGHX:   Past Surgical History   Procedure Laterality Date   • Mary by laparoscopy N/A 3/17/2016     Procedure: MARY BY LAPAROSCOPY;  Surgeon: Danyel Seymour M.D.;  Location: SURGERY [facility];  Service:      SOCHX:  reports that he has been smoking Cigarettes.  He does not have any smokeless tobacco history on " "file. He reports that he drinks alcohol.  FH: In accordance with [place] Act of 2008, family history is not collected for Occupational Health visits.         Objective:     /88 mmHg  Pulse 78  Temp(Src) 36.7 °C (98 °F)  Resp 16  Ht 1.626 m (5' 4\")  Wt 80.287 kg (177 lb)  BMI 30.37 kg/m2  SpO2 94%     Physical Exam    Left Shoulder: No gross deformity. Tenderness palpation anterior GH and lateral shoulder. Decreased range of motion with flexion 170°, abduction 120°.  strength intact. Reflexes 2+/4.       Assessment/Plan:     1. Glenoid fracture of shoulder, left, with routine healing, subsequent encounter  - ibuprofen (MOTRIN) 600 MG Tab; Take 1 Tab by mouth every 6 hours as needed.  Dispense: 60 Tab; Refill: 0      Call case  and try to get seen at another clinic  Ibuprofen 600 mg as needed for pain  Continue restricted duty  Follow-up 2 weeks    Spoke with Case  Julieth, who authorized him to be seen at [facility] instead to attempt to get earlier appointment. Provided info to patient  "

## 2017-03-21 NOTE — MR AVS SNAPSHOT
"        Venancio Rosado   3/21/2017 2:20 PM   Occupational Medicine   MRN: [id number]    Department:  [facility]   Dept Phone:  [phone]    Description:  Male : 1965   Provider:  Toi Campbell D.O.           Reason for Visit     Work-Related Injury 17 L shoulder still hurts      Allergies as of 3/21/2017     No Known Allergies      You were diagnosed with     Glenoid fracture of shoulder, left, with routine healing, subsequent encounter   [[id number]]         Vital Signs     Blood Pressure Pulse Temperature Respirations Height Weight    140/88 mmHg 78 36.7 °C (98 °F) 16 1.626 m (5' 4\") 80.287 kg (177 lb)    Body Mass Index Oxygen Saturation Smoking Status             30.37 kg/m2 94% Current Some Day Smoker         Basic Information     Date Of Birth Sex Race Ethnicity Preferred Language    1965 Male Patient Refused  Origin (Mohawk,Jordanian,Nigerian,Tongan, etc) English      Your appointments     2017  2:00 PM   Workers Compensation with Toi Campbell D.O.   [address] [address]   [phone]              Health Maintenance        Date Due Completion Dates    IMM DTaP/Tdap/Td Vaccine (1 - Tdap) 1985 ---    COLONOSCOPY 2016 ---    IMM INFLUENZA (1) 2016 ---            Current Immunizations     No immunizations on file.      Below and/or attached are the medications your provider expects you to take. Review all of your home medications and newly ordered medications with your provider and/or pharmacist. Follow medication instructions as directed by your provider and/or pharmacist. Please keep your medication list with you and share with your provider. Update the information when medications are discontinued, doses are changed, or new medications (including over-the-counter products) are added; and carry medication information at all times in the event of emergency situations    " Allergies:  No Known Allergies          Medications  Valid as of: March 21, 2017 -  2:57 PM    Generic Name Brand Name Tablet Size Instructions for use    Hydrocodone-Acetaminophen (Tab) NORCO 5-325 MG Take 1 Tab by mouth every four hours as needed for up to 9 doses.        Ibuprofen (Tab) MOTRIN 600 MG Take 1 Tab by mouth every 6 hours as needed.        Lisinopril (Tab) PRINIVIL 10 MG Take 1 Tab by mouth every day.        MetFORMIN HCl (Tab) GLUCOPHAGE 500 MG Take 1 Tab by mouth 2 times a day, with meals.        Oxycodone-Acetaminophen (Tab) PERCOCET 5-325 MG Take 1-2 Tabs by mouth every four hours as needed for Moderate Pain.        .                 Medicines prescribed today were sent to:     [facility]/PHARMACY #[id number] - [place], [place] - [address]    [address] [address]    Phone: [phone] Fax: [phone]    Open 24 Hours?: No      Medication refill instructions:       If your prescription bottle indicates you have medication refills left, it is not necessary to call your provider’s office. Please contact your pharmacy and they will refill your medication.    If your prescription bottle indicates you do not have any refills left, you may request refills at any time through one of the following ways: The online [employer] system (except Urgent Care), by calling your provider’s office, or by asking your pharmacy to contact your provider’s office with a refill request. Medication refills are processed only during regular business hours and may not be available until the next business day. Your provider may request additional information or to have a follow-up visit with you prior to refilling your medication.   *Please Note: Medication refills are assigned a new Rx number when refilled electronically. Your pharmacy may indicate that no refills were authorized even though a new prescription for the same medication is available at the pharmacy. Please request the medicine by name with the  pharmacy before contacting your provider for a refill.           MyChart Status: Patient Declined

## 2017-04-04 ENCOUNTER — OCCUPATIONAL MEDICINE (OUTPATIENT)
Dept: OCCUPATIONAL MEDICINE | Facility: CLINIC | Age: 52
End: 2017-04-04
Payer: COMMERCIAL

## 2017-04-04 VITALS
TEMPERATURE: 97.9 F | DIASTOLIC BLOOD PRESSURE: 88 MMHG | HEART RATE: 69 BPM | OXYGEN SATURATION: 97 % | RESPIRATION RATE: 16 BRPM | WEIGHT: 178 LBS | SYSTOLIC BLOOD PRESSURE: 132 MMHG | HEIGHT: 64 IN | BODY MASS INDEX: 30.39 KG/M2

## 2017-04-04 DIAGNOSIS — S42.142D: ICD-10-CM

## 2017-04-04 DIAGNOSIS — S42.152D: ICD-10-CM

## 2017-04-04 PROCEDURE — 99213 OFFICE O/P EST LOW 20 MIN: CPT | Performed by: PREVENTIVE MEDICINE

## 2017-04-04 ASSESSMENT — ENCOUNTER SYMPTOMS
CONSTITUTIONAL NEGATIVE: 1
NECK PAIN: 0
NEUROLOGICAL NEGATIVE: 1

## 2017-04-04 NOTE — PROGRESS NOTES
"Subjective:      Venancio Rosado is a 51 y.o. male who presents with Work-Related Injury      DOI 1/27/2017: Patient was covering concrete when he tripped and fell, landing on his left shoulder. He heard a \"pop\" when he landed. Seen in ED, XR and CT scan showed inferior glenoid fracture of the left shoulder with 2mm articular gap. Repeat CT showed evidence of healing and no displacement. Patient states that he has some improvement. In range of motion however it is still limited. These are some pain on the lateral shoulder. Takes ibuprofen as needed for relief. He is not working due to the restrictions. He states that the urine are physical therapy could not find any work comp approval and refused to treat him until they obtained that, but never called back. Set to being PT with [facility] tomorrow.     HPI    Review of Systems   Constitutional: Negative.    Musculoskeletal: Positive for joint pain. Negative for neck pain.   Skin: Negative.    Neurological: Negative.      PMH:  has a past medical history of Gallstones.  MEDS:   Current outpatient prescriptions:   •  ibuprofen (MOTRIN) 600 MG Tab, Take 1 Tab by mouth every 6 hours as needed., Disp: 60 Tab, Rfl: 0  •  hydrocodone-acetaminophen (NORCO) 5-325 MG Tab per tablet, Take 1 Tab by mouth every four hours as needed for up to 9 doses., Disp: 9 Tab, Rfl: 0  •  metformin (GLUCOPHAGE) 500 MG Tab, Take 1 Tab by mouth 2 times a day, with meals., Disp: 60 Tab, Rfl: 0  •  lisinopril (PRINIVIL) 10 MG Tab, Take 1 Tab by mouth every day., Disp: 30 Tab, Rfl: 0  •  oxycodone-acetaminophen (PERCOCET) 5-325 MG Tab, Take 1-2 Tabs by mouth every four hours as needed for Moderate Pain., Disp: 28 Tab, Rfl: 0  ALLERGIES: No Known Allergies  SURGHX:   Past Surgical History   Procedure Laterality Date   • Mary by laparoscopy N/A 3/17/2016     Procedure: MARY BY LAPAROSCOPY;  Surgeon: Danyel Seymour M.D.;  Location: SURGERY [facility];  Service:      SOCHX:  reports that he has " "been smoking Cigarettes.  He does not have any smokeless tobacco history on file. He reports that he drinks alcohol..         Objective:     /88 mmHg  Pulse 69  Temp(Src) 36.6 °C (97.9 °F)  Resp 16  Ht 1.626 m (5' 4\")  Wt 80.74 kg (178 lb)  BMI 30.54 kg/m2  SpO2 97%     Physical Exam   Constitutional: He is oriented to person, place, and time. He appears well-developed and well-nourished.   Cardiovascular: Normal rate.    Pulmonary/Chest: Effort normal.   Neurological: He is alert and oriented to person, place, and time.   Skin: Skin is warm and dry.   Psychiatric: He has a normal mood and affect. His behavior is normal.       Left Shoulder: No gross deformity. Tenderness palpation anterior GH and lateral shoulder. Decreased range of motion with flexion 160°, abduction 120°.  strength intact. Reflexes 2+/4.       Assessment/Plan:     1. Glenoid fracture of shoulder, left, with routine healing, subsequent encounter  Begin physical therapy  Take ibuprofen as needed  Continue restricted duty  Follow-up 2 weeks        "

## 2017-04-04 NOTE — Clinical Note
"[facility]   [address] [place] [address],   [address] [address]  Phone: [phone] - Fax: [phone]        [facility] Progress Report and Disability Certification  Date of Service: 4/4/2017   No Show:  No  Date / Time of Next Visit: 4/18/2017@2:10PM    Claim Information   Patient Name: Venancio Rosado  Claim Number:     Employer:   [employer]   Date of Injury: 1/27/2017     Insurer / TPA: [employer]  ID / SSN:     Occupation: finisher  Diagnosis: The encounter diagnosis was Glenoid fracture of shoulder, left, with routine healing, subsequent encounter.    Medical Information   Related to Industrial Injury? Yes    Subjective Complaints:  DOI 1/27/2017: Patient was covering concrete when he tripped and fell, landing on his left shoulder. He heard a \"pop\" when he landed. Seen in ED, XR and CT scan showed inferior glenoid fracture of the left shoulder with 2mm articular gap. Repeat CT showed evidence of healing and no displacement. Patient states that he has some improvement. In range of motion however it is still limited. These are some pain on the lateral shoulder. Takes ibuprofen as needed for relief. He is not working due to the restrictions. He states that the urine are physical therapy could not find any work comp approval and refused to treat him until they obtained that, but never called back. Set to being PT with [facility] tomorrow.   Objective Findings: Left Shoulder: No gross deformity. Tenderness palpation anterior GH and lateral shoulder. Decreased range of motion with flexion 160°, abduction 120°.  strength intact. Reflexes 2+/4.   Pre-Existing Condition(s):     Assessment:   Condition Same    Status: Additional Care Required  Permanent Disability:No    Plan:      Diagnostics:      Comments:  Begin physical therapy  Take ibuprofen as needed  Continue restricted duty  Follow-up 2 weeks    Disability Information   Status: Released " to Restricted Duty    From:  4/4/2017  Through: 4/18/2017 Restrictions are: Temporary   Physical Restrictions   Sitting:    Standing:    Stooping:    Bending:      Squatting:    Walking:    Climbing:    Pushing:      Pulling:    Other:    Reaching Above Shoulder (L): < or = to 2 hrs/day Reaching Above Shoulder (R):       Reaching Below Shoulder (L):    Reaching Below Shoulder (R):      Not to exceed Weight Limits   Carrying(hrs):   Weight Limit(lb): < or = to 25 pounds Lifting(hrs):   Weight  Limit(lb): < or = to 25 pounds   Comments:      Repetitive Actions   Hands: i.e. Fine Manipulations from Grasping:     Feet: i.e. Operating Foot Controls:     Driving / Operate Machinery:     Physician Name: Toi Campbell D.O. Physician Signature: TOI Marrero D.O. e-Signature: Dr. Srini Canela, Medical Director   Clinic Name / Location: [address],   [address] [place] [address] Clinic Phone Number: Dept: [phone]   Appointment Time: 2:00 Pm Visit Start Time: 2:02 PM   Check-In Time:  2:01 Pm Visit Discharge Time:  @2:19PM    Original-Treating Physician or Chiropractor    Page 2-Insurer/TPA    Page 3-Employer    Page 4-Employee

## 2017-04-04 NOTE — MR AVS SNAPSHOT
"        Venancio Rosado   2017 2:00 PM   Occupational Medicine   MRN: [id number]    Department:  [facility]   Dept Phone:  [phone]    Description:  Male : 1965   Provider:  Toi Campbell D.O.           Reason for Visit     Work-Related Injury DOI 17 L shoulder same       Allergies as of 2017     No Known Allergies      You were diagnosed with     Glenoid fracture of shoulder, left, with routine healing, subsequent encounter   [[id number]]         Vital Signs     Blood Pressure Pulse Temperature Respirations Height Weight    132/88 mmHg 69 36.6 °C (97.9 °F) 16 1.626 m (5' 4\") 80.74 kg (178 lb)    Body Mass Index Oxygen Saturation Smoking Status             30.54 kg/m2 97% Current Some Day Smoker         Basic Information     Date Of Birth Sex Race Ethnicity Preferred Language    1965 Male Patient Refused  Origin (Jordanian,Saudi Arabian,Paraguayan,Michael, etc) English      Your appointments     2017  2:10 PM   Workers Compensation with Toi Campbell D.O.   [address] [address]   [phone]              Health Maintenance        Date Due Completion Dates    IMM DTaP/Tdap/Td Vaccine (1 - Tdap) 1985 ---    COLONOSCOPY 2016 ---            Current Immunizations     No immunizations on file.      Below and/or attached are the medications your provider expects you to take. Review all of your home medications and newly ordered medications with your provider and/or pharmacist. Follow medication instructions as directed by your provider and/or pharmacist. Please keep your medication list with you and share with your provider. Update the information when medications are discontinued, doses are changed, or new medications (including over-the-counter products) are added; and carry medication information at all times in the event of emergency situations     Allergies:  No Known Allergies          "   Medications  Valid as of: April 04, 2017 -  2:26 PM    Generic Name Brand Name Tablet Size Instructions for use    Hydrocodone-Acetaminophen (Tab) NORCO 5-325 MG Take 1 Tab by mouth every four hours as needed for up to 9 doses.        Ibuprofen (Tab) MOTRIN 600 MG Take 1 Tab by mouth every 6 hours as needed.        Lisinopril (Tab) PRINIVIL 10 MG Take 1 Tab by mouth every day.        MetFORMIN HCl (Tab) GLUCOPHAGE 500 MG Take 1 Tab by mouth 2 times a day, with meals.        Oxycodone-Acetaminophen (Tab) PERCOCET 5-325 MG Take 1-2 Tabs by mouth every four hours as needed for Moderate Pain.        .                 Medicines prescribed today were sent to:     [facility]/PHARMACY #[id number] - [place] - [address]    [address] [address]    Phone: [phone] Fax: [phone]    Open 24 Hours?: No      Medication refill instructions:       If your prescription bottle indicates you have medication refills left, it is not necessary to call your provider’s office. Please contact your pharmacy and they will refill your medication.    If your prescription bottle indicates you do not have any refills left, you may request refills at any time through one of the following ways: The online [employer] system (except Urgent Care), by calling your provider’s office, or by asking your pharmacy to contact your provider’s office with a refill request. Medication refills are processed only during regular business hours and may not be available until the next business day. Your provider may request additional information or to have a follow-up visit with you prior to refilling your medication.   *Please Note: Medication refills are assigned a new Rx number when refilled electronically. Your pharmacy may indicate that no refills were authorized even though a new prescription for the same medication is available at the pharmacy. Please request the medicine by name with the pharmacy before contacting your provider  for a refill.           MyChart Status: Patient Declined

## 2017-04-18 ENCOUNTER — OCCUPATIONAL MEDICINE (OUTPATIENT)
Dept: OCCUPATIONAL MEDICINE | Facility: CLINIC | Age: 52
End: 2017-04-18
Payer: COMMERCIAL

## 2017-04-18 VITALS
TEMPERATURE: 97.9 F | WEIGHT: 175 LBS | DIASTOLIC BLOOD PRESSURE: 108 MMHG | BODY MASS INDEX: 29.88 KG/M2 | OXYGEN SATURATION: 97 % | SYSTOLIC BLOOD PRESSURE: 178 MMHG | RESPIRATION RATE: 16 BRPM | HEART RATE: 65 BPM | HEIGHT: 64 IN

## 2017-04-18 DIAGNOSIS — S42.152D: ICD-10-CM

## 2017-04-18 DIAGNOSIS — S42.142D: ICD-10-CM

## 2017-04-18 PROCEDURE — 99212 OFFICE O/P EST SF 10 MIN: CPT | Performed by: PREVENTIVE MEDICINE

## 2017-04-18 NOTE — MR AVS SNAPSHOT
"        Venancio Rosado   2017 2:10 PM   Occupational Medicine   MRN: [id number]    Department:  [facility]   Dept Phone:  [phone]    Description:  Male : 1965   Provider:  Toi Campbell D.O.           Reason for Visit     Other room #3 WC FV L Shoulder DOI 17      Allergies as of 2017     No Known Allergies      You were diagnosed with     Glenoid fracture of shoulder, left, with routine healing, subsequent encounter   [[id number]]         Vital Signs     Blood Pressure Pulse Temperature Respirations Height Weight    178/108 mmHg 65 36.6 °C (97.9 °F) 16 1.626 m (5' 4\") 79.379 kg (175 lb)    Body Mass Index Oxygen Saturation Smoking Status             30.02 kg/m2 97% Current Some Day Smoker         Basic Information     Date Of Birth Sex Race Ethnicity Preferred Language    1965 Male Patient Refused  Origin (Yakut,Northern Irish,Dominican,Michael, etc) English      Your appointments     May 02, 2017  2:20 PM   Workers Compensation with Toi Campbell D.O.   [address] [address]   [phone]              Health Maintenance        Date Due Completion Dates    IMM DTaP/Tdap/Td Vaccine (1 - Tdap) 1985 ---    COLONOSCOPY 2016 ---            Current Immunizations     No immunizations on file.      Below and/or attached are the medications your provider expects you to take. Review all of your home medications and newly ordered medications with your provider and/or pharmacist. Follow medication instructions as directed by your provider and/or pharmacist. Please keep your medication list with you and share with your provider. Update the information when medications are discontinued, doses are changed, or new medications (including over-the-counter products) are added; and carry medication information at all times in the event of emergency situations     Allergies:  No Known Allergies          "   Medications  Valid as of: April 18, 2017 -  2:46 PM    Generic Name Brand Name Tablet Size Instructions for use    Hydrocodone-Acetaminophen (Tab) NORCO 5-325 MG Take 1 Tab by mouth every four hours as needed for up to 9 doses.        Ibuprofen (Tab) MOTRIN 600 MG Take 1 Tab by mouth every 6 hours as needed.        Lisinopril (Tab) PRINIVIL 10 MG Take 1 Tab by mouth every day.        MetFORMIN HCl (Tab) GLUCOPHAGE 500 MG Take 1 Tab by mouth 2 times a day, with meals.        Oxycodone-Acetaminophen (Tab) PERCOCET 5-325 MG Take 1-2 Tabs by mouth every four hours as needed for Moderate Pain.        .                 Medicines prescribed today were sent to:     [facility]/PHARMACY #[id number] - [place] - [address]    [address] [address]    Phone: [phone] Fax: [phone]    Open 24 Hours?: No      Medication refill instructions:       If your prescription bottle indicates you have medication refills left, it is not necessary to call your provider’s office. Please contact your pharmacy and they will refill your medication.    If your prescription bottle indicates you do not have any refills left, you may request refills at any time through one of the following ways: The online [employer] system (except Urgent Care), by calling your provider’s office, or by asking your pharmacy to contact your provider’s office with a refill request. Medication refills are processed only during regular business hours and may not be available until the next business day. Your provider may request additional information or to have a follow-up visit with you prior to refilling your medication.   *Please Note: Medication refills are assigned a new Rx number when refilled electronically. Your pharmacy may indicate that no refills were authorized even though a new prescription for the same medication is available at the pharmacy. Please request the medicine by name with the pharmacy before contacting your provider  for a refill.           MyChart Status: Patient Declined

## 2017-04-18 NOTE — PROGRESS NOTES
"Subjective:      Venancio Rosado is a 51 y.o. male who presents with Other      DOI 1/27/2017: Patient was covering concrete when he tripped and fell, landing on his left shoulder, sustained glenoid fracture. Repeat CT showed evidence of healing and no displacement. Patient states that his left shoulder pain has continued to slowly improve. He states he has some mild to moderate at times pain in the left lateral aspect of the shoulder and upper arm. Pain is dull and achy. Pain is worse with lifting or trying to reach above his head. Takes ibuprofen as needed for relief. His attendant for visits of physical therapy which has improved his range of motion and the pain somewhat. Denies any radiating pain, numbness, tingling or weakness.     Other        ROS    FH: No pertinent family history to this problem.         Objective:     /108 mmHg  Pulse 65  Temp(Src) 36.6 °C (97.9 °F)  Resp 16  Ht 1.626 m (5' 4\")  Wt 79.379 kg (175 lb)  BMI 30.02 kg/m2  SpO2 97%     Physical Exam    Left Shoulder: No gross deformity. Tenderness palpation anterior GH and lateral shoulder. Decreased range of motion with flexion 160°, abduction 120°. Strength intact. Empty can test negative. Speed's test negative. Reflexes 2+/4.       Assessment/Plan:     1. Glenoid fracture of shoulder, left, with routine healing, subsequent encounter  Continue physical therapy  Continue ibuprofen as needed  Continue restricted duty  Follow-up 2 weeks        "

## 2017-04-18 NOTE — Clinical Note
[address],   [address] [address]  Phone: [phone] - Fax: [phone]        [facility] Progress Report and Disability Certification  Date of Service: 4/18/2017   No Show:  No  Date / Time of Next Visit: 5/2/2017@2:20PM    Claim Information   Patient Name: Venancio Rosado  Claim Number:     Employer:   [employer]    Date of Injury: 1/27/2017     Insurer / TPA: [employer]  ID / SSN:     Occupation: finisher  Diagnosis: The encounter diagnosis was Glenoid fracture of shoulder, left, with routine healing, subsequent encounter.    Medical Information   Related to Industrial Injury? Yes    Subjective Complaints:  DOI 1/27/2017: Patient was covering concrete when he tripped and fell, landing on his left shoulder, sustained glenoid fracture. Repeat CT showed evidence of healing and no displacement. Patient states that his left shoulder pain has continued to slowly improve. He states he has some mild to moderate at times pain in the left lateral aspect of the shoulder and upper arm. Pain is dull and achy. Pain is worse with lifting or trying to reach above his head. Takes ibuprofen as needed for relief. His attendant for visits of physical therapy which has improved his range of motion and the pain somewhat. Denies any radiating pain, numbness, tingling or weakness.   Objective Findings: Left Shoulder: No gross deformity. Tenderness palpation anterior GH and lateral shoulder. Decreased range of motion with flexion 160°, abduction 120°. Strength intact. Empty can test negative. Speed's test negative. Reflexes 2+/4.   Pre-Existing Condition(s):     Assessment:   Condition Same    Status: Additional Care Required  Permanent Disability:No    Plan:      Diagnostics:      Comments:  Continue physical therapy  Continue ibuprofen as needed  Continue restricted.  Follow-up 2 weeks    Disability Information   Status: Released  to Restricted Duty    From:  4/18/2017  Through: 5/2/2017 Restrictions are: Temporary   Physical Restrictions   Sitting:    Standing:    Stooping:    Bending:      Squatting:    Walking:    Climbing:    Pushing:      Pulling:    Other:    Reaching Above Shoulder (L): < or = to 2 hrs/day Reaching Above Shoulder (R):       Reaching Below Shoulder (L):    Reaching Below Shoulder (R):      Not to exceed Weight Limits   Carrying(hrs):   Weight Limit(lb): < or = to 25 pounds Lifting(hrs):   Weight  Limit(lb): < or = to 25 pounds   Comments:      Repetitive Actions   Hands: i.e. Fine Manipulations from Grasping:     Feet: i.e. Operating Foot Controls:     Driving / Operate Machinery:     Physician Name: Toi Campbell D.O. Physician Signature: TOI Marrero D.O. e-Signature: Dr. Srini Canela, Medical Director   Clinic Name / Location: [address],   [address] [place] [address] Clinic Phone Number: Dept: [phone]   Appointment Time: 2:10 Pm Visit Start Time: 1:56 PM   Check-In Time:  1:51 Pm Visit Discharge Time: @2:08PM    Original-Treating Physician or Chiropractor    Page 2-Insurer/TPA    Page 3-Employer    Page 4-Employee

## 2017-05-02 ENCOUNTER — OCCUPATIONAL MEDICINE (OUTPATIENT)
Dept: OCCUPATIONAL MEDICINE | Facility: CLINIC | Age: 52
End: 2017-05-02
Payer: COMMERCIAL

## 2017-05-02 VITALS
TEMPERATURE: 98.1 F | BODY MASS INDEX: 30.25 KG/M2 | HEIGHT: 64 IN | SYSTOLIC BLOOD PRESSURE: 158 MMHG | HEART RATE: 96 BPM | RESPIRATION RATE: 16 BRPM | WEIGHT: 177.2 LBS | DIASTOLIC BLOOD PRESSURE: 98 MMHG | OXYGEN SATURATION: 95 %

## 2017-05-02 DIAGNOSIS — S42.142D: ICD-10-CM

## 2017-05-02 DIAGNOSIS — S42.152D: ICD-10-CM

## 2017-05-02 PROCEDURE — 99213 OFFICE O/P EST LOW 20 MIN: CPT | Performed by: PREVENTIVE MEDICINE

## 2017-05-02 ASSESSMENT — ENCOUNTER SYMPTOMS
FOCAL WEAKNESS: 0
SENSORY CHANGE: 0
TINGLING: 0
NECK PAIN: 0

## 2017-05-02 NOTE — Clinical Note
[facility]   [address] [place] [address],   [address] [address]  Phone: [phone] - Fax: [phone]        [facility] Progress Report and Disability Certification  Date of Service: 5/2/2017   No Show:  No  Date / Time of Next Visit: 5/16/2017@11:30AM   Claim Information   Patient Name: Venancio Rosado  Claim Number:     Employer:   [employer]  Date of Injury: 1/27/2017     Insurer / TPA: [employer]  ID / SSN:     Occupation: finisher  Diagnosis: The encounter diagnosis was Glenoid fracture of shoulder, left, with routine healing, subsequent encounter.    Medical Information   Related to Industrial Injury? Yes    Subjective Complaints:  DOI 1/27/2017: Patient was covering concrete when he tripped and fell, landing on his left shoulder, sustained glenoid fracture. Repeat CT showed evidence of healing and no displacement. Patient states that overall his pain continues to improve, but continues to have pain in the anterior shoulder especially at night. Pain is sharp at times. Continues to have difficulty with range of motion of the shoulder level., He has attended 6 out of 10 physical therapy visits. Denies any numbness or tingling.   Objective Findings: Left Shoulder: No gross deformity. Tenderness palpation anterior GH and lateral shoulder. Decreased range of motion with flexion 140°, abduction 120°. Strength intact. Empty can test negative. Speed's test negative. Reflexes 2+/4.   Pre-Existing Condition(s):     Assessment:   Condition Same    Status: Additional Care Required  Permanent Disability:No    Plan:      Diagnostics:      Comments:  Continue physical therapy  Referral to orthopedic surgery for further evaluation  Continue restricted duty  Follow-up 2 weeks    Disability Information   Status: Released to Restricted Duty    From:  5/2/2017  Through: 5/16/2017 Restrictions are: Temporary   Physical Restrictions   Sitting:   Standing:    Stooping:    Bending:      Squatting:    Walking:    Climbing:    Pushing:      Pulling:    Other:    Reaching Above Shoulder (L): < or = to 1 hr/day Reaching Above Shoulder (R):       Reaching Below Shoulder (L):    Reaching Below Shoulder (R):      Not to exceed Weight Limits   Carrying(hrs):   Weight Limit(lb): < or = to 25 pounds Lifting(hrs):   Weight  Limit(lb): < or = to 25 pounds   Comments:      Repetitive Actions   Hands: i.e. Fine Manipulations from Grasping:     Feet: i.e. Operating Foot Controls:     Driving / Operate Machinery:     Physician Name: Toi Campbell D.O. Physician Signature: TOI Marrero D.O. e-Signature: Dr. Srini Canela, Medical Director   Clinic Name / Location: [address],   [address]  [place] [address] Clinic Phone Number: Dept: [phone]   Appointment Time: 2:20 Pm Visit Start Time: 2:28 PM   Check-In Time:  2:16 Pm Visit Discharge Time: @2:46PM    Original-Treating Physician or Chiropractor    Page 2-Insurer/TPA    Page 3-Employer    Page 4-Employee

## 2017-05-02 NOTE — MR AVS SNAPSHOT
"        Venancio Rosado   2017 2:20 PM   Occupational Medicine   MRN: [id number]    Department:  [facility]   Dept Phone:  [phone]    Description:  Male : 1965   Provider:  Toi Campbell D.O.           Reason for Visit     Other WC F[facility] DOI 17 (L) shoulder, same, room 3      Allergies as of 2017     No Known Allergies      You were diagnosed with     Glenoid fracture of shoulder, left, with routine healing, subsequent encounter   [[id number]]         Vital Signs     Blood Pressure Pulse Temperature Respirations Height Weight    158/98 mmHg 96 36.7 °C (98.1 °F) 16 1.626 m (5' 4.02\") 80.377 kg (177 lb 3.2 oz)    Body Mass Index Oxygen Saturation Smoking Status             30.40 kg/m2 95% Current Some Day Smoker         Basic Information     Date Of Birth Sex Race Ethnicity Preferred Language    1965 Male Patient Refused  Origin (Colombian,Bangladeshi,Sammarinese,Venezuelan, etc) English      Health Maintenance        Date Due Completion Dates    IMM DTaP/Tdap/Td Vaccine (1 - Tdap) 1985 ---    COLONOSCOPY 2016 ---            Current Immunizations     No immunizations on file.      Below and/or attached are the medications your provider expects you to take. Review all of your home medications and newly ordered medications with your provider and/or pharmacist. Follow medication instructions as directed by your provider and/or pharmacist. Please keep your medication list with you and share with your provider. Update the information when medications are discontinued, doses are changed, or new medications (including over-the-counter products) are added; and carry medication information at all times in the event of emergency situations     Allergies:  No Known Allergies          Medications  Valid as of: May 02, 2017 -  2:41 PM    Generic Name Brand Name Tablet Size Instructions for use    Hydrocodone-Acetaminophen (Tab) NORCO 5-325 MG Take 1 Tab by mouth every four hours as " needed for up to 9 doses.        Ibuprofen (Tab) MOTRIN 600 MG Take 1 Tab by mouth every 6 hours as needed.        Lisinopril (Tab) PRINIVIL 10 MG Take 1 Tab by mouth every day.        MetFORMIN HCl (Tab) GLUCOPHAGE 500 MG Take 1 Tab by mouth 2 times a day, with meals.        Oxycodone-Acetaminophen (Tab) PERCOCET 5-325 MG Take 1-2 Tabs by mouth every four hours as needed for Moderate Pain.        .                 Medicines prescribed today were sent to:     [facility] #[id number] - [facility] [place] - [address]    [address] [address]    Phone: [phone] Fax: [phone]    Open 24 Hours?: No      Medication refill instructions:       If your prescription bottle indicates you have medication refills left, it is not necessary to call your provider’s office. Please contact your pharmacy and they will refill your medication.    If your prescription bottle indicates you do not have any refills left, you may request refills at any time through one of the following ways: The online [email or web address] system (except Urgent Care), by calling your provider’s office, or by asking your pharmacy to contact your provider’s office with a refill request. Medication refills are processed only during regular business hours and may not be available until the next business day. Your provider may request additional information or to have a follow-up visit with you prior to refilling your medication.   *Please Note: Medication refills are assigned a new Rx number when refilled electronically. Your pharmacy may indicate that no refills were authorized even though a new prescription for the same medication is available at the pharmacy. Please request the medicine by name with the pharmacy before contacting your provider for a refill.        Referral     A referral request has been sent to our patient care coordination department. Please allow 3-5 business days for us to process this request and contact you either by  phone or mail. If you do not hear from us by the 5th business day, please call us at [phone].           MyChart Status: Patient Declined

## 2017-05-02 NOTE — PROGRESS NOTES
"Subjective:      Venancio Rosado is a 51 y.o. male who presents with Other      DOI 1/27/2017: Patient was covering concrete when he tripped and fell, landing on his left shoulder, sustained glenoid fracture. Repeat CT showed evidence of healing and no displacement. Patient states that overall his pain continues to improve, but continues to have pain in the anterior shoulder especially at night. Pain is sharp at times. Continues to have difficulty with range of motion of the shoulder level., He has attended 6 out of 10 physical therapy visits. Denies any numbness or tingling.     Other  Pertinent negatives include no neck pain.       Review of Systems   Musculoskeletal: Negative for neck pain.   Neurological: Negative for tingling, sensory change and focal weakness.     SOCHX: Works as a finisher  FH: No pertinent family history to this problem.         Objective:     /98 mmHg  Pulse 96  Temp(Src) 36.7 °C (98.1 °F)  Resp 16  Ht 1.626 m (5' 4.02\")  Wt 80.377 kg (177 lb 3.2 oz)  BMI 30.40 kg/m2  SpO2 95%     Physical Exam   Constitutional: He is oriented to person, place, and time. He appears well-developed and well-nourished.   Cardiovascular: Normal rate.    Pulmonary/Chest: Effort normal.   Neurological: He is alert and oriented to person, place, and time.   Skin: Skin is warm and dry.   Psychiatric: He has a normal mood and affect. Judgment normal.       Left Shoulder: No gross deformity. Tenderness palpation anterior GH and lateral shoulder. Decreased range of motion with flexion 140°, abduction 120°. Strength intact. Empty can test negative. Speed's test negative. Reflexes 2+/4.       Assessment/Plan:     1. Glenoid fracture of shoulder, left, with routine healing, subsequent encounter  - REFERRAL TO ORTHOPEDICS    Continue physical therapy  Referral to orthopedic surgery for further evaluation, given continued difficulty with ROM  Continue restricted duty  Follow-up 2 weeks    "

## 2017-05-16 ENCOUNTER — TELEPHONE (OUTPATIENT)
Dept: OCCUPATIONAL MEDICINE | Facility: CLINIC | Age: 52
End: 2017-05-16

## 2017-05-16 ENCOUNTER — OCCUPATIONAL MEDICINE (OUTPATIENT)
Dept: OCCUPATIONAL MEDICINE | Facility: CLINIC | Age: 52
End: 2017-05-16
Payer: COMMERCIAL

## 2017-05-16 VITALS
HEIGHT: 64 IN | OXYGEN SATURATION: 98 % | SYSTOLIC BLOOD PRESSURE: 124 MMHG | TEMPERATURE: 98.8 F | HEART RATE: 68 BPM | BODY MASS INDEX: 30.73 KG/M2 | DIASTOLIC BLOOD PRESSURE: 74 MMHG | WEIGHT: 180 LBS

## 2017-05-16 DIAGNOSIS — S42.142D: ICD-10-CM

## 2017-05-16 DIAGNOSIS — S42.152D: ICD-10-CM

## 2017-05-16 PROCEDURE — 99212 OFFICE O/P EST SF 10 MIN: CPT | Performed by: PREVENTIVE MEDICINE

## 2017-05-16 ASSESSMENT — PAIN SCALES - GENERAL: PAINLEVEL: 6=MODERATE PAIN

## 2017-05-16 NOTE — TELEPHONE ENCOUNTER
Contacted :               MARCELL HERNANDEZ S  Orthopaedics    [address] [place] [address] [address]    [place] [address]  [phone]             Patient was seen on 05-15-17. I asked if we could receive notes on his visit and they are not quite ready, possibly later this afternoon. Patient has his next appointment with Ortho. On June 14

## 2017-05-16 NOTE — PROGRESS NOTES
"Subjective:      Venancio Rosado is a 51 y.o. male who presents with Follow-Up      DOI 1/27/2017: Patient was covering concrete when he tripped and fell, landing on his left shoulder, sustained glenoid fracture. Repeat CT showed evidence of healing and no displacement.  Patient states that his range of motion is improved somewhat but continues a pain in the anterior aspect of the shoulder. He states he was seen by orthopedics and recommended injection and to discontinue physical therapy. He stated he might need surgery.     HPI    ROS       Objective:     /74 mmHg  Pulse 68  Temp(Src) 37.1 °C (98.8 °F)  Ht 1.626 m (5' 4.02\")  Wt 81.647 kg (180 lb)  BMI 30.88 kg/m2  SpO2 98%     Physical Exam    Left Shoulder: No gross deformity. Tenderness palpation anterior GH and lateral shoulder. Decreased range of motion with flexion 140°, abduction 120°. Sensation intact. Strength intact.       Assessment/Plan:     1. Glenoid fracture of shoulder, left, with routine healing, subsequent encounter  Has follow up with Ortho June 14th  Transfer care to Ortho  Restrictions per Ortho  No follow up        "

## 2017-05-16 NOTE — Clinical Note
[address],   [address] [address]  Phone: [phone] - Fax: [phone]        [facility] Progress Report and Disability Certification  Date of Service: 5/16/2017   No Show:  No  Date / Time of Next Visit:  Transfer care to Ortho   Claim Information   Patient Name: Venancio Rosado  Claim Number:     Employer:   [employer]  Date of Injury: 1/27/2017     Insurer / TPA: [employer]  ID / SSN:     Occupation: finisher  Diagnosis: The encounter diagnosis was Glenoid fracture of shoulder, left, with routine healing, subsequent encounter.    Medical Information   Related to Industrial Injury? Yes    Subjective Complaints:  DOI 1/27/2017: Patient was covering concrete when he tripped and fell, landing on his left shoulder, sustained glenoid fracture. Repeat CT showed evidence of healing and no displacement.  Patient states that his range of motion is improved somewhat but continues a pain in the anterior aspect of the shoulder. He states he was seen by orthopedics and recommended injection and to discontinue physical therapy. He stated he might need surgery.   Objective Findings: Left Shoulder: No gross deformity. Tenderness palpation anterior GH and lateral shoulder. Decreased range of motion with flexion 140°, abduction 120°. Sensation intact. Strength intact.   Pre-Existing Condition(s):     Assessment:   Condition Same    Status: Discharged / Care Transfer  Permanent Disability:No    Plan:      Diagnostics:      Comments:  Has follow up with Ortho June 14th  Transfer care to Ortho  Restrictions per Ortho  No follow up     Disability Information   Status: Released to Restricted Duty    From:  5/16/2017  Through:   Restrictions are:     Physical Restrictions   Sitting:    Standing:    Stooping:    Bending:      Squatting:    Walking:    Climbing:    Pushing:      Pulling:    Other:    Reaching Above Shoulder (L):    Reaching Above Shoulder (R):       Reaching Below Shoulder (L):    Reaching Below Shoulder (R):      Not to exceed Weight Limits   Carrying(hrs):   Weight Limit(lb):   Lifting(hrs):   Weight  Limit(lb):     Comments: Restrictions per Orthopedic    Repetitive Actions   Hands: i.e. Fine Manipulations from Grasping:     Feet: i.e. Operating Foot Controls:     Driving / Operate Machinery:     Physician Name: Toi García D.O. Physician Signature: [email or web address] GARCÍA D.O. e-Signature: Dr. Srini Canela, Medical Director   Clinic Name / Location: [address],   [address]  [place] [address] Clinic Phone Number: Dept: [phone]   Appointment Time: 11:30 Am Visit Start Time: 11:39 AM   Check-In Time:  11:27 Am Visit Discharge Time: 12:00 PM    Original-Treating Physician or Chiropractor    Page 2-Insurer/TPA    Page 3-Employer    Page 4-Employee

## 2021-03-30 ENCOUNTER — IMMUNIZATION (OUTPATIENT)
Dept: FAMILY PLANNING/WOMEN'S HEALTH CLINIC | Facility: IMMUNIZATION CENTER | Age: 56
End: 2021-03-30
Payer: OTHER GOVERNMENT

## 2021-03-30 DIAGNOSIS — Z23 ENCOUNTER FOR VACCINATION: Primary | ICD-10-CM

## 2021-03-30 PROCEDURE — 0001A PFIZER SARS-COV-2 VACCINE: CPT | Performed by: INTERNAL MEDICINE

## 2021-03-30 PROCEDURE — 91300 PFIZER SARS-COV-2 VACCINE: CPT | Performed by: INTERNAL MEDICINE

## 2021-04-23 ENCOUNTER — IMMUNIZATION (OUTPATIENT)
Dept: FAMILY PLANNING/WOMEN'S HEALTH CLINIC | Facility: IMMUNIZATION CENTER | Age: 56
End: 2021-04-23
Attending: INTERNAL MEDICINE
Payer: OTHER GOVERNMENT

## 2021-04-23 DIAGNOSIS — Z23 ENCOUNTER FOR VACCINATION: Primary | ICD-10-CM

## 2021-04-23 PROCEDURE — 91300 PFIZER SARS-COV-2 VACCINE: CPT

## 2021-04-23 PROCEDURE — 0002A PFIZER SARS-COV-2 VACCINE: CPT
